# Patient Record
Sex: FEMALE | Race: WHITE | Employment: OTHER | URBAN - METROPOLITAN AREA
[De-identification: names, ages, dates, MRNs, and addresses within clinical notes are randomized per-mention and may not be internally consistent; named-entity substitution may affect disease eponyms.]

---

## 2017-03-10 ENCOUNTER — APPOINTMENT (OUTPATIENT)
Dept: CT IMAGING | Age: 82
DRG: 536 | End: 2017-03-10
Attending: EMERGENCY MEDICINE
Payer: MEDICARE

## 2017-03-10 ENCOUNTER — APPOINTMENT (OUTPATIENT)
Dept: GENERAL RADIOLOGY | Age: 82
DRG: 536 | End: 2017-03-10
Attending: EMERGENCY MEDICINE
Payer: MEDICARE

## 2017-03-10 ENCOUNTER — HOSPITAL ENCOUNTER (INPATIENT)
Age: 82
LOS: 3 days | Discharge: NURSING, REHAB OR LONG-TERM CARE | DRG: 536 | End: 2017-03-13
Attending: EMERGENCY MEDICINE | Admitting: FAMILY MEDICINE
Payer: MEDICARE

## 2017-03-10 DIAGNOSIS — F03.90 DEMENTIA WITHOUT BEHAVIORAL DISTURBANCE, UNSPECIFIED DEMENTIA TYPE: ICD-10-CM
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
## 2017-03-10 DIAGNOSIS — S72.114A CLOSED NONDISPLACED FRACTURE OF GREATER TROCHANTER OF RIGHT FEMUR, INITIAL ENCOUNTER (HCC): ICD-10-CM

## 2017-03-10 PROBLEM — S72.113A FRACTURE OF GREATER TROCHANTER (HCC): Status: ACTIVE | Noted: 2017-03-10

## 2017-03-10 LAB
ALBUMIN SERPL BCP-MCNC: 3.6 G/DL (ref 3.4–5)
ALBUMIN/GLOB SERPL: 1.3 {RATIO} (ref 0.8–1.7)
ALP SERPL-CCNC: 62 U/L (ref 45–117)
ALT SERPL-CCNC: 14 U/L (ref 13–56)
ANION GAP BLD CALC-SCNC: 6 MMOL/L (ref 3–18)
APTT PPP: 31.5 SEC (ref 23–36.4)
AST SERPL W P-5'-P-CCNC: 17 U/L (ref 15–37)
BASOPHILS # BLD AUTO: 0 K/UL (ref 0–0.1)
BASOPHILS # BLD: 0 % (ref 0–2)
BILIRUB SERPL-MCNC: 0.6 MG/DL (ref 0.2–1)
BUN SERPL-MCNC: 33 MG/DL (ref 7–18)
BUN/CREAT SERPL: 50 (ref 12–20)
CALCIUM SERPL-MCNC: 8.7 MG/DL (ref 8.5–10.1)
CHLORIDE SERPL-SCNC: 100 MMOL/L (ref 100–108)
CO2 SERPL-SCNC: 34 MMOL/L (ref 21–32)
CREAT SERPL-MCNC: 0.66 MG/DL (ref 0.6–1.3)
DIFFERENTIAL METHOD BLD: ABNORMAL
EOSINOPHIL # BLD: 0.2 K/UL (ref 0–0.4)
EOSINOPHIL NFR BLD: 2 % (ref 0–5)
ERYTHROCYTE [DISTWIDTH] IN BLOOD BY AUTOMATED COUNT: 14.4 % (ref 11.6–14.5)
GLOBULIN SER CALC-MCNC: 2.7 G/DL (ref 2–4)
GLUCOSE SERPL-MCNC: 93 MG/DL (ref 74–99)
HCT VFR BLD AUTO: 34.2 % (ref 35–45)
HGB BLD-MCNC: 10.9 G/DL (ref 12–16)
INR PPP: 1.1 (ref 0.8–1.2)
LYMPHOCYTES # BLD AUTO: 9 % (ref 21–52)
LYMPHOCYTES # BLD: 0.7 K/UL (ref 0.9–3.6)
MCH RBC QN AUTO: 31.5 PG (ref 24–34)
MCHC RBC AUTO-ENTMCNC: 31.9 G/DL (ref 31–37)
MCV RBC AUTO: 98.8 FL (ref 74–97)
MONOCYTES # BLD: 0.7 K/UL (ref 0.05–1.2)
MONOCYTES NFR BLD AUTO: 9 % (ref 3–10)
NEUTS SEG # BLD: 6.4 K/UL (ref 1.8–8)
NEUTS SEG NFR BLD AUTO: 80 % (ref 40–73)
PLATELET # BLD AUTO: 197 K/UL (ref 135–420)
PMV BLD AUTO: 10.4 FL (ref 9.2–11.8)
POTASSIUM SERPL-SCNC: 4 MMOL/L (ref 3.5–5.5)
PROT SERPL-MCNC: 6.3 G/DL (ref 6.4–8.2)
PROTHROMBIN TIME: 13.9 SEC (ref 11.5–15.2)
RBC # BLD AUTO: 3.46 M/UL (ref 4.2–5.3)
SODIUM SERPL-SCNC: 140 MMOL/L (ref 136–145)
WBC # BLD AUTO: 8 K/UL (ref 4.6–13.2)

## 2017-03-10 PROCEDURE — 74011250637 HC RX REV CODE- 250/637: Performed by: EMERGENCY MEDICINE

## 2017-03-10 PROCEDURE — 74011250637 HC RX REV CODE- 250/637: Performed by: FAMILY MEDICINE

## 2017-03-10 PROCEDURE — 70450 CT HEAD/BRAIN W/O DYE: CPT

## 2017-03-10 PROCEDURE — 80053 COMPREHEN METABOLIC PANEL: CPT | Performed by: EMERGENCY MEDICINE

## 2017-03-10 PROCEDURE — 93005 ELECTROCARDIOGRAM TRACING: CPT

## 2017-03-10 PROCEDURE — 65270000029 HC RM PRIVATE

## 2017-03-10 PROCEDURE — 71010 XR CHEST SNGL V: CPT

## 2017-03-10 PROCEDURE — 85610 PROTHROMBIN TIME: CPT | Performed by: EMERGENCY MEDICINE

## 2017-03-10 PROCEDURE — 85025 COMPLETE CBC W/AUTO DIFF WBC: CPT | Performed by: EMERGENCY MEDICINE

## 2017-03-10 PROCEDURE — 73502 X-RAY EXAM HIP UNI 2-3 VIEWS: CPT

## 2017-03-10 PROCEDURE — 99284 EMERGENCY DEPT VISIT MOD MDM: CPT

## 2017-03-10 PROCEDURE — 85730 THROMBOPLASTIN TIME PARTIAL: CPT | Performed by: EMERGENCY MEDICINE

## 2017-03-10 PROCEDURE — 74011250636 HC RX REV CODE- 250/636: Performed by: FAMILY MEDICINE

## 2017-03-10 RX ORDER — DONEPEZIL HYDROCHLORIDE 5 MG/1
20 TABLET, FILM COATED ORAL DAILY
Status: DISCONTINUED | OUTPATIENT
Start: 2017-03-11 | End: 2017-03-13 | Stop reason: HOSPADM

## 2017-03-10 RX ORDER — FUROSEMIDE 40 MG/1
40 TABLET ORAL DAILY
Status: DISCONTINUED | OUTPATIENT
Start: 2017-03-11 | End: 2017-03-13 | Stop reason: HOSPADM

## 2017-03-10 RX ORDER — GUAIFENESIN 100 MG/5ML
81 LIQUID (ML) ORAL DAILY
Status: DISCONTINUED | OUTPATIENT
Start: 2017-03-11 | End: 2017-03-13 | Stop reason: HOSPADM

## 2017-03-10 RX ORDER — ACETAMINOPHEN 325 MG/1
650 TABLET ORAL
Status: DISCONTINUED | OUTPATIENT
Start: 2017-03-10 | End: 2017-03-13 | Stop reason: HOSPADM

## 2017-03-10 RX ORDER — POTASSIUM CHLORIDE 750 MG/1
10 TABLET, EXTENDED RELEASE ORAL DAILY
Status: DISCONTINUED | OUTPATIENT
Start: 2017-03-11 | End: 2017-03-13 | Stop reason: HOSPADM

## 2017-03-10 RX ORDER — MELATONIN
2000 DAILY
Status: DISCONTINUED | OUTPATIENT
Start: 2017-03-11 | End: 2017-03-13 | Stop reason: HOSPADM

## 2017-03-10 RX ORDER — METOPROLOL TARTRATE 25 MG/1
12.5 TABLET, FILM COATED ORAL 2 TIMES DAILY
Status: DISCONTINUED | OUTPATIENT
Start: 2017-03-10 | End: 2017-03-13 | Stop reason: HOSPADM

## 2017-03-10 RX ORDER — OXYBUTYNIN CHLORIDE 5 MG/1
10 TABLET, EXTENDED RELEASE ORAL DAILY
Status: DISCONTINUED | OUTPATIENT
Start: 2017-03-11 | End: 2017-03-13 | Stop reason: HOSPADM

## 2017-03-10 RX ORDER — MEGESTROL ACETATE 40 MG/ML
200 SUSPENSION ORAL 2 TIMES DAILY
Status: DISCONTINUED | OUTPATIENT
Start: 2017-03-10 | End: 2017-03-13 | Stop reason: HOSPADM

## 2017-03-10 RX ORDER — DOCUSATE SODIUM 100 MG/1
100 CAPSULE, LIQUID FILLED ORAL 2 TIMES DAILY
Status: DISCONTINUED | OUTPATIENT
Start: 2017-03-10 | End: 2017-03-13 | Stop reason: HOSPADM

## 2017-03-10 RX ORDER — HEPARIN SODIUM 5000 [USP'U]/ML
5000 INJECTION, SOLUTION INTRAVENOUS; SUBCUTANEOUS EVERY 8 HOURS
Status: DISCONTINUED | OUTPATIENT
Start: 2017-03-10 | End: 2017-03-13 | Stop reason: HOSPADM

## 2017-03-10 RX ORDER — SODIUM CHLORIDE 9 MG/ML
75 INJECTION, SOLUTION INTRAVENOUS CONTINUOUS
Status: DISCONTINUED | OUTPATIENT
Start: 2017-03-10 | End: 2017-03-11

## 2017-03-10 RX ORDER — ASCORBIC ACID 250 MG
1000 TABLET ORAL DAILY
Status: DISCONTINUED | OUTPATIENT
Start: 2017-03-11 | End: 2017-03-13 | Stop reason: HOSPADM

## 2017-03-10 RX ORDER — HYDROCODONE BITARTRATE AND ACETAMINOPHEN 5; 325 MG/1; MG/1
1 TABLET ORAL
Status: DISCONTINUED | OUTPATIENT
Start: 2017-03-10 | End: 2017-03-13 | Stop reason: HOSPADM

## 2017-03-10 RX ORDER — METOPROLOL TARTRATE 25 MG/1
25 TABLET, FILM COATED ORAL 2 TIMES DAILY
Status: DISCONTINUED | OUTPATIENT
Start: 2017-03-10 | End: 2017-03-10

## 2017-03-10 RX ORDER — ACETAMINOPHEN 325 MG/1
650 TABLET ORAL
Status: COMPLETED | OUTPATIENT
Start: 2017-03-10 | End: 2017-03-10

## 2017-03-10 RX ORDER — SERTRALINE HYDROCHLORIDE 50 MG/1
100 TABLET, FILM COATED ORAL DAILY
Status: DISCONTINUED | OUTPATIENT
Start: 2017-03-11 | End: 2017-03-13 | Stop reason: HOSPADM

## 2017-03-10 RX ORDER — MEMANTINE HYDROCHLORIDE 10 MG/1
10 TABLET ORAL 2 TIMES DAILY
Status: DISCONTINUED | OUTPATIENT
Start: 2017-03-10 | End: 2017-03-13 | Stop reason: HOSPADM

## 2017-03-10 RX ORDER — ALBUTEROL SULFATE 0.83 MG/ML
2.5 SOLUTION RESPIRATORY (INHALATION)
Status: DISCONTINUED | OUTPATIENT
Start: 2017-03-10 | End: 2017-03-13 | Stop reason: HOSPADM

## 2017-03-10 RX ORDER — RISPERIDONE 0.25 MG/1
0.5 TABLET, FILM COATED ORAL
Status: DISCONTINUED | OUTPATIENT
Start: 2017-03-10 | End: 2017-03-13 | Stop reason: HOSPADM

## 2017-03-10 RX ADMIN — HYDROCODONE BITARTRATE AND ACETAMINOPHEN 1 TABLET: 5; 325 TABLET ORAL at 22:04

## 2017-03-10 RX ADMIN — HEPARIN SODIUM 5000 UNITS: 5000 INJECTION, SOLUTION INTRAVENOUS; SUBCUTANEOUS at 22:09

## 2017-03-10 RX ADMIN — HEPARIN SODIUM 5000 UNITS: 5000 INJECTION, SOLUTION INTRAVENOUS; SUBCUTANEOUS at 14:45

## 2017-03-10 RX ADMIN — METOPROLOL TARTRATE 12.5 MG: 25 TABLET ORAL at 19:21

## 2017-03-10 RX ADMIN — HYDROCODONE BITARTRATE AND ACETAMINOPHEN 1 TABLET: 5; 325 TABLET ORAL at 14:45

## 2017-03-10 RX ADMIN — ACETAMINOPHEN 650 MG: 325 TABLET, FILM COATED ORAL at 11:11

## 2017-03-10 RX ADMIN — DOCUSATE SODIUM 100 MG: 100 CAPSULE, LIQUID FILLED ORAL at 19:21

## 2017-03-10 RX ADMIN — RISPERIDONE 0.5 MG: 0.25 TABLET ORAL at 22:04

## 2017-03-10 RX ADMIN — SODIUM CHLORIDE 75 ML/HR: 900 INJECTION, SOLUTION INTRAVENOUS at 19:24

## 2017-03-10 RX ADMIN — MEGESTROL ACETATE 200 MG: 40 SUSPENSION ORAL at 22:04

## 2017-03-10 NOTE — ED TRIAGE NOTES
Pt was at home at [phone] went to go to the rest room had a fall. Pt denies hitting her head and pt is not on blood thinners. Pt has right hip pain and right ear pain.

## 2017-03-10 NOTE — H&P
[address] [place] [address]    Name:  Tong Michael  MR#:  [id number]  :  1924  Account #:  [de-identified]  Date of Adm:  03/10/2017      CHIEF COMPLAINT: Fall, right hip fracture. HISTORY OF PRESENT ILLNESS: The patient is a 68-year-old  female with a past medical history significant for chronic hypoxic  respiratory failure due to chronic obstructive pulmonary disease on 2  liters per minute around the clock, sick sinus syndrome with history of  pacemaker implantation, atrial fibrillation, hypertension, dementia, who  presents to the emergency department after having an unwitnessed  fall. History is obtained primarily from the son as the patient has a light  bit of dementia. She normally ambulates with a walker. This morning  around 6 or 6:30 a.m., the patient's son heard a thump, and found the  patient correction outside the bathroom. She had apparently tried to  ambulate up off the toilet, and suffered a fall. He believes that she may  have hit the right portion of the back of her head on a door frame. The  patient denies any loss of consciousness, dizziness or  lightheadedness, any headaches or visual changes or chest pain or  shortness of breath. The patient's son states that there was no seizure  activity, no bowel or bladder incontinence. She tried to get up, she  could not bear any weight because of right hip pain. She was then  brought to the emergency department. He noticed that she seemed to be a little bit more confused than  normal just after the fall. She seems to be a little bit more oriented now  and question at baseline. She has no other complaints at the current time and is resting  comfortably. In the emergency department, she had plain films performed showing  a right greater trochanteric fracture. She had a CT of her head, which  was negative for acute.  She was referred to our service and  Orthopedics has been consulted by the emergency department  physician. REVIEW OF SYSTEMS  The patient has a history of dementia. When I asked her questions,  she seems to answer blanket denials. She denies any headaches or  visual changes, dizziness or lightheadedness, any fevers or chills,  nausea, vomiting, chest pain, shortness of breath, palpitations, edema,  abdominal pain, dysuria, hematuria, polyuria, oliguria, constipation,  diarrhea, melena, hematochezia, rash, bruise, bleed or hot or cold  intolerance. The patient's son seems to agree. No recent illnesses. Review otherwise negative 10 element review. ALLERGIES  1. NSAIDS. 2. NITROFURANTOIN. MEDICATIONS: This is obtained from the medication list brought with  the patient and reviewed the patient's son    1. Namenda 10 mg p.o. b.i.d.  2. Lopressor 50 mg p.o. daily. 3. Ditropan XL 10 mg p.o. b.i.d.  4. Potassium chloride 10 mEq p.o. daily. 5. Aricept 20 mg tablets 2 tabs p.o. daily. 6. Megace suspension daily. 7. Aspirin 81 mg p.o. daily, but the patient's son stated that this was  discontinued, unclear reasons. 8. Albuterol nebulizer every 4 hours as needed for wheezing. 9. Synthroid 125 mcg p.o. daily. 10. Lasix 40 mg p.o. every a.m., 20 grams p.o. each evening. 11. Zoloft 100 mg p.o. daily. 12. Centrum Silver multivitamin p.o. daily. 13. Vitamin D3, 1000 units p.o. daily. 14. Vitamin C 1000 mg p.o. daily. 15. Risperidone 0.5 mg p.o. at bedtime. PAST MEDICAL HISTORY  1. Chronic hypoxic respiratory failure due to chronic obstructive  pulmonary disease on 2 liters per minute around the clock. 2. Sick sinus syndrome with history of pacemaker implantation and  atrial fibrillation. 3. Hypertension. 4. Dementia. PAST SURGICAL HISTORY: No recent surgeries. Remote history of  cholecystectomy, appendectomy, hysterectomy, pacemaker  implantation. FAMILY HISTORY: Denies. SOCIAL HISTORY: No alcohol, tobacco or any other drug use.  The  patient's son states that she wishes to be FULL CODE. PHYSICAL EXAMINATION  VITAL SIGNS: Last set of vitals includes a temperature of 97.5, pulse  62, respiratory rate 24, saturating 99%. She is on 2  liters when I examined her. GENERAL: On examination, the patient is a frail, elderly appearing  female who is awake, alert and pleasant and conversant. She appears  to be comfortable. Son present at bedside. HEENT: Head is normocephalic and atraumatic. Pupils equal, round  and reactive to light. Extraocular motions are intact. Nares are patent  on both sides with nasal cannula in place. Posterior pharynx is clear. Mucous membranes are tacky. Tongue is midline. NECK: Supple, no lymphadenopathy. CARDIOVASCULAR: Regular rate and rhythm, a 3/6 systolic  ejection murmur best heard at the left sternal margin. PULMONARY: Clear to auscultation bilaterally. ABDOMEN: Soft, nontender, nondistended. Normal bowel sounds. No  masses are felt. EXTREMITIES: No calf tenderness, no edema, 1+ bilateral lower  extremity pedal pulses. She has 5/5 motor strength x4 with the  exception of limited range of motion involving the right lower extremity  due to pain at the hip. Intact light touch sensation bilateral lower  extremities. NEUROLOGIC: Cranial nerves 2 through 12 grossly intact. LABORATORY DATA: Include a metabolic panel with a sodium of 140,  potassium 4.0, chloride 100, CO2 of 34, BUN 33, creatinine 0.66,  glucose 93, calcium 8.7. Liver function tests with a total protein of 6.3, albumin 3.6, total bilirubin  0.6, alkaline phosphatase 62, ALT 14, AST 17. CBC with a white count of 8.0, hemoglobin and hematocrit of  10.9/34.2, platelets of 646. Differential 80 segs, 9 lymphs, 9 monos, 2  eosinophils. PT/INR 13.9/1.1, APTT of 31.5. Chest x-ray negative for acute. Report shows stable cardiomegaly,  chronic prominent interstitial markings, predominantly within the upper  lungs without change consistent with chronic interstitial infiltrates.     CT scan of the head by report is negative for acute. No evidence of  intracranial hemorrhages or any other definable acute intracranial  process. No discernable fracture in skull. Additional chronic findings  were age-related findings. X-ray of the right hip shows right greater trochanteric fracture. I have ordered an EKG. At bedside, she appears to be paced in the 60s. IMPRESSION: A 80-year-old female with a history significant for the  above, who is here status post mechanical fall. She has a right hip  fracture. PLAN  1. Right hip fracture: Status post mechanical fall. Plan per Orthopedics. Deep venous thrombosis prophylaxis in the form of heparin 5000 units  subcu every 8 hours, we will maintain pain control as needed. N.p.o.  for the time being, except for medications. 2. Chronic hypoxic respiratory failure: due to chronic obstructive  pulmonary disease. No acute issues. Maintain 2 liters per minute  around the clock. Albuterol nebulizer as needed. 3. Hypertension: We will maintain her outpatient medications, spitting  up her Lopressor to twice daily dosing at a lower dose for the time  being. Review of the records and discussion with the patient's son  reveals that there was some concern about the patient having elevated  blood pressure and a low diastolic. Reviewing Cardiology notes from  an outpatient basis reveals that the preference is to maintain a slightly  higher pressures versus lower pressure causing hypotensive  symptoms. For the time being, I am going to go ahead and maintain  her on Lopressor 12.5 mg p.o. b.i.d. At bedside, her pressures have  been in the low 100s to 120s on the telemetry monitor. We will  continue to follow closely. 4. Sick sinus syndrome with a history of pacemaker implantation, atrial  fibrillation. I am not sure why the patient was discontinued from her  aspirin. I will go ahead and maintain this. Her last medication list has  this listed.  We will maintain her on the Lopressor dose as per above. Otherwise, no acute issues. We will check an EKG now.  5. Hypothyroidism: We will maintain Synthroid. Check TSH. 6. Dementia: Maintain her outpatient medication regimen. It looks like  she was on according to list Aricept at 20 mg daily, Namenda at 10 mg  b.i.d. I am going to maintain the Aricept and Namenda dosing. She is  on risperidone at night as well, which we will go ahead and continue. 7. Deep venous thrombosis prophylaxis in the form of heparin 5000  units subcu every 8 hours.         Reuel Fury, MD Sherryle Almond / Monika Rothman  D:  03/10/2017   13:11  T:  03/10/2017   13:49  Job #:  [id number]

## 2017-03-10 NOTE — ED PROVIDER NOTES
HPI Comments: 7:38 AM Fernando Mcmillan is a 80 y.o. female with a history of CAD, A-fib, and dementia who presents to the ED via EMS after an unwitnessed fall at home. Son states that she normally ambulates with a walker. This morning around 6:30 \"I heard a thump and I found her on the floor, group home outside of the bathroom\". He states that when he tried to get her up, she wouldn't put any weight on her RLE and was complaining of R hip pain so he carried her back to bed. He denies any changes to her behavior or mental status at present but [place] seemed a little more out of it when I first found her\". She does have a history of frequent falls, she is not on AC. Pt notes \"a little\" posterior R hip pain with ROM. No other acute symptoms or complaints were noted. The history is provided by the patient.         Past Medical History:   Diagnosis Date    Arrhythmia     a-fib has pacemaker    Arthritis of both knees     Severe, end-stage    Atrial fibrillation (HCC)     Atrial fibrillation (HCC)     Balance problems     Bursitis of right hip     Chronic; with mild arthritis    CAD (coronary artery disease)     pt denies    Cardiomyopathy ([address]     Cerebral microvascular disease     CT 10/2014    Distal radius fracture, left     Easy bruising     Easy bleeding    Extrinsic asthma, unspecified     Fall     H/O mitral valve prolapse     Hearing loss     Hemangioma     CT 10/2014    History of ankle fracture     HX OTHER MEDICAL     walker    Hypothyroidism     Idiopathic osteoporosis     Long term (current) use of anticoagulants     Memory loss     Moderate persistent asthma     Osteoporosis     Peptic ulcer     Pneumonia     Pure hyperglyceridemia     Right foot pain 2014    Thyroid disease     Urinary problem     Wears glasses     Weight loss     Mild       Past Surgical History:   Procedure Laterality Date    HX APPENDECTOMY      HX CHOLECYSTECTOMY      HX HEENT      Jaw surgery following injury    HX HERNIA REPAIR Bilateral     HX HYSTERECTOMY      HX ORTHOPAEDIC      procedure on back    HX ORTHOPAEDIC      Ankle surgery    HX OTHER SURGICAL      Breast implant surgeries    HX PACEMAKER      HX UROLOGICAL           Family History:   Problem Relation Age of Onset    Other Sister      Respiratory disease    Arthritis-osteo      Hypertension      Diabetes         Social History     Social History    Marital status:      Spouse name: N/A    Number of children: N/A    Years of education: N/A     Occupational History    Not on file. Social History Main Topics    Smoking status: Never Smoker    Smokeless tobacco: Not on file    Alcohol use No    Drug use: No    Sexual activity: No     Other Topics Concern    Not on file     Social History Narrative         ALLERGIES: Nsaids (non-steroidal anti-inflammatory drug) and Nitrofurantoin    Review of Systems   Unable to perform ROS: Dementia   Constitutional: Negative for diaphoresis and fever. HENT: Positive for ear pain. Negative for rhinorrhea and trouble swallowing. Eyes: Negative for visual disturbance. Respiratory: Negative for shortness of breath. Cardiovascular: Negative for chest pain. Gastrointestinal: Negative for vomiting. Musculoskeletal: Positive for arthralgias and gait problem. Negative for back pain and neck pain. (+) Right hip pain   Skin: Negative for wound. Neurological: Negative for headaches. Hematological: Bruises/bleeds easily. Psychiatric/Behavioral: Positive for confusion. All other systems reviewed and are negative. Vitals:    03/10/17 0730   BP: 141/75   Resp: 26   Temp: 97.5 °F (36.4 °C)   SpO2: 100%            Physical Exam   Constitutional: She appears well-developed and well-nourished. No distress. Resting comfortably on stretcher   HENT:   Head: Normocephalic and atraumatic.    MM moist   Eyes: Conjunctivae and EOM are normal. Pupils are equal, round, and reactive to light. No scleral icterus. Sclera clear bilaterally   Neck: Normal range of motion. Neck supple. No JVD present. Non-tender to palpation along posterior midline   Cardiovascular: Normal rate and regular rhythm. Exam reveals no gallop and no friction rub. Murmur heard. Pulmonary/Chest: Effort normal and breath sounds normal. No respiratory distress. She has no wheezes. She has no rales. She exhibits no tenderness. No crepitance with palpation   Abdominal: Soft. She exhibits no distension. There is no tenderness. Genitourinary:   Genitourinary Comments: No CVA tenderness   Musculoskeletal: She exhibits tenderness. She exhibits no edema or deformity. Normal inspection of upper extremities. No edema noted to bilateral lower extremities. No midline back tenderness to palpation. No tenderness with palpation along long bones, including clavicles. All compartments are soft and easily compressible. No pain with ROM to bilateral ankles, knees, wrists, elbows or shoulders. No gross deformities appreciated. Pt able to sit up in bed without R hip pain, able to range R hip but notes \"a little\" pain to posterior R hip, no tenderness with palpation. Lymphadenopathy:     She has no cervical adenopathy. Neurological: She is alert. No cranial nerve deficit. She exhibits normal muscle tone. Coordination normal.   Normal motor and sensation bilaterally to upper and lower extremities. [place]. Oriented to person and place. Skin: Skin is warm and dry. She is not diaphoretic. Psychiatric:   Grossly WNL   Vitals reviewed. MDM  Number of Diagnoses or Management Options  Diagnosis management comments: Pt with unwitnessed fall and subsequent R hip pain with ? AMS immediately after fall. No focal neuro findings at present but will check CT head. XR R hip and check labs. Treat pain. Reassess. Pt with R greater troch fx.   Discussed with pt and son, agree with admission plans, PT/OT. Discussed with Dr. Deanna Ponce, requests admission to Dr. Issa Jensen service. Discussed with Dr. Emry Siemens, will consult on pt. Amount and/or Complexity of Data Reviewed  Clinical lab tests: ordered and reviewed  Tests in the radiology section of CPT®: ordered and reviewed  Decide to obtain previous medical records or to obtain history from someone other than the patient: yes  Obtain history from someone other than the patient: yes  Discuss the patient with other providers: yes  Independent visualization of images, tracings, or specimens: yes    Risk of Complications, Morbidity, and/or Mortality  Presenting problems: moderate  Management options: moderate    Patient Progress  Patient progress: stable    ED Course       Procedures    XR hip:  R greater troch fx    CXR:  No acute pulmonary process, cardiomegaly. Scribe Attestation:     Padmaja Sierra, kannanibing for and in the presence of Chyna Slater MD March 10, 2017     Signed by: Sonia Chavez, March 10, 2017 at 7:44 AM     Physician Attestation:   I personally performed the services described in this documentation, reviewed and edited the documentation which was dictated to the scribe in my presence, and it accurately records my words and actions.  Chyna Slater MD  March 10, 2017

## 2017-03-10 NOTE — ED NOTES
Pt. Resting quietly in NAD; Assisted on/off bedpan w/o difficulty. Repositioned in bed. Siderails up. VSS.

## 2017-03-11 LAB
ANION GAP BLD CALC-SCNC: 5 MMOL/L (ref 3–18)
ATRIAL RATE: 60 BPM
BASOPHILS # BLD AUTO: 0 K/UL (ref 0–0.06)
BASOPHILS # BLD: 1 % (ref 0–2)
BUN SERPL-MCNC: 27 MG/DL (ref 7–18)
BUN/CREAT SERPL: 38 (ref 12–20)
CALCIUM SERPL-MCNC: 8.1 MG/DL (ref 8.5–10.1)
CALCULATED R AXIS, ECG10: -75 DEGREES
CALCULATED T AXIS, ECG11: 80 DEGREES
CHLORIDE SERPL-SCNC: 103 MMOL/L (ref 100–108)
CO2 SERPL-SCNC: 33 MMOL/L (ref 21–32)
CREAT SERPL-MCNC: 0.72 MG/DL (ref 0.6–1.3)
DIAGNOSIS, 93000: NORMAL
DIFFERENTIAL METHOD BLD: ABNORMAL
EOSINOPHIL # BLD: 0.1 K/UL (ref 0–0.4)
EOSINOPHIL NFR BLD: 1 % (ref 0–5)
ERYTHROCYTE [DISTWIDTH] IN BLOOD BY AUTOMATED COUNT: 13.9 % (ref 11.6–14.5)
GLUCOSE BLD STRIP.AUTO-MCNC: 187 MG/DL (ref 70–110)
GLUCOSE SERPL-MCNC: 113 MG/DL (ref 74–99)
HCT VFR BLD AUTO: 28.4 % (ref 35–45)
HGB BLD-MCNC: 8.9 G/DL (ref 12–16)
LYMPHOCYTES # BLD AUTO: 11 % (ref 21–52)
LYMPHOCYTES # BLD: 0.7 K/UL (ref 0.9–3.6)
MCH RBC QN AUTO: 31.1 PG (ref 24–34)
MCHC RBC AUTO-ENTMCNC: 31.3 G/DL (ref 31–37)
MCV RBC AUTO: 99.3 FL (ref 74–97)
MONOCYTES # BLD: 0.7 K/UL (ref 0.05–1.2)
MONOCYTES NFR BLD AUTO: 12 % (ref 3–10)
NEUTS SEG # BLD: 4.6 K/UL (ref 1.8–8)
NEUTS SEG NFR BLD AUTO: 75 % (ref 40–73)
PLATELET # BLD AUTO: 152 K/UL (ref 135–420)
PMV BLD AUTO: 10.4 FL (ref 9.2–11.8)
POTASSIUM SERPL-SCNC: 3.8 MMOL/L (ref 3.5–5.5)
Q-T INTERVAL, ECG07: 504 MS
QRS DURATION, ECG06: 198 MS
QTC CALCULATION (BEZET), ECG08: 507 MS
RBC # BLD AUTO: 2.86 M/UL (ref 4.2–5.3)
SODIUM SERPL-SCNC: 141 MMOL/L (ref 136–145)
TSH SERPL DL<=0.05 MIU/L-ACNC: 2.38 UIU/ML (ref 0.36–3.74)
VENTRICULAR RATE, ECG03: 61 BPM
WBC # BLD AUTO: 6.1 K/UL (ref 4.6–13.2)

## 2017-03-11 PROCEDURE — 84443 ASSAY THYROID STIM HORMONE: CPT | Performed by: FAMILY MEDICINE

## 2017-03-11 PROCEDURE — 65270000029 HC RM PRIVATE

## 2017-03-11 PROCEDURE — 74011250637 HC RX REV CODE- 250/637: Performed by: FAMILY MEDICINE

## 2017-03-11 PROCEDURE — 74011250636 HC RX REV CODE- 250/636: Performed by: ORTHOPAEDIC SURGERY

## 2017-03-11 PROCEDURE — 74011250636 HC RX REV CODE- 250/636: Performed by: FAMILY MEDICINE

## 2017-03-11 PROCEDURE — 82962 GLUCOSE BLOOD TEST: CPT

## 2017-03-11 PROCEDURE — 77010033678 HC OXYGEN DAILY

## 2017-03-11 PROCEDURE — 85025 COMPLETE CBC W/AUTO DIFF WBC: CPT | Performed by: FAMILY MEDICINE

## 2017-03-11 PROCEDURE — 36415 COLL VENOUS BLD VENIPUNCTURE: CPT | Performed by: FAMILY MEDICINE

## 2017-03-11 PROCEDURE — 80048 BASIC METABOLIC PNL TOTAL CA: CPT | Performed by: FAMILY MEDICINE

## 2017-03-11 RX ORDER — HYDROMORPHONE HYDROCHLORIDE 1 MG/ML
0.5 INJECTION, SOLUTION INTRAMUSCULAR; INTRAVENOUS; SUBCUTANEOUS
Status: DISCONTINUED | OUTPATIENT
Start: 2017-03-11 | End: 2017-03-11

## 2017-03-11 RX ADMIN — POTASSIUM CHLORIDE 10 MEQ: 750 TABLET, EXTENDED RELEASE ORAL at 08:58

## 2017-03-11 RX ADMIN — OXYBUTYNIN CHLORIDE 10 MG: 5 TABLET, FILM COATED, EXTENDED RELEASE ORAL at 09:05

## 2017-03-11 RX ADMIN — MEGESTROL ACETATE 200 MG: 40 SUSPENSION ORAL at 21:13

## 2017-03-11 RX ADMIN — HEPARIN SODIUM 5000 UNITS: 5000 INJECTION, SOLUTION INTRAVENOUS; SUBCUTANEOUS at 13:01

## 2017-03-11 RX ADMIN — SERTRALINE HYDROCHLORIDE 100 MG: 50 TABLET ORAL at 09:06

## 2017-03-11 RX ADMIN — RISPERIDONE 0.5 MG: 0.25 TABLET ORAL at 21:13

## 2017-03-11 RX ADMIN — VITAMIN D, TAB 1000IU (100/BT) 2000 UNITS: 25 TAB at 09:05

## 2017-03-11 RX ADMIN — FUROSEMIDE 40 MG: 40 TABLET ORAL at 09:06

## 2017-03-11 RX ADMIN — MEMANTINE 10 MG: 10 TABLET ORAL at 17:10

## 2017-03-11 RX ADMIN — METOPROLOL TARTRATE 12.5 MG: 25 TABLET ORAL at 17:10

## 2017-03-11 RX ADMIN — ASPIRIN 81 MG CHEWABLE TABLET 81 MG: 81 TABLET CHEWABLE at 09:06

## 2017-03-11 RX ADMIN — DOCUSATE SODIUM 100 MG: 100 CAPSULE, LIQUID FILLED ORAL at 09:04

## 2017-03-11 RX ADMIN — MEGESTROL ACETATE 200 MG: 40 SUSPENSION ORAL at 08:59

## 2017-03-11 RX ADMIN — LEVOTHYROXINE SODIUM 125 MCG: 100 TABLET ORAL at 06:53

## 2017-03-11 RX ADMIN — Medication 1000 MG: at 09:05

## 2017-03-11 RX ADMIN — SODIUM CHLORIDE 75 ML/HR: 900 INJECTION, SOLUTION INTRAVENOUS at 10:32

## 2017-03-11 RX ADMIN — MEMANTINE 10 MG: 10 TABLET ORAL at 09:00

## 2017-03-11 RX ADMIN — HYDROCODONE BITARTRATE AND ACETAMINOPHEN 1 TABLET: 5; 325 TABLET ORAL at 06:53

## 2017-03-11 RX ADMIN — DOCUSATE SODIUM 100 MG: 100 CAPSULE, LIQUID FILLED ORAL at 17:10

## 2017-03-11 RX ADMIN — HEPARIN SODIUM 5000 UNITS: 5000 INJECTION, SOLUTION INTRAVENOUS; SUBCUTANEOUS at 05:01

## 2017-03-11 RX ADMIN — METOPROLOL TARTRATE 12.5 MG: 25 TABLET ORAL at 09:04

## 2017-03-11 RX ADMIN — MULTIPLE VITAMINS W/ MINERALS TAB 1 TABLET: TAB at 09:05

## 2017-03-11 RX ADMIN — HYDROMORPHONE HYDROCHLORIDE 0.5 MG: 1 INJECTION, SOLUTION INTRAMUSCULAR; INTRAVENOUS; SUBCUTANEOUS at 08:58

## 2017-03-11 RX ADMIN — DONEPEZIL HYDROCHLORIDE 20 MG: 5 TABLET, FILM COATED ORAL at 09:04

## 2017-03-11 NOTE — ROUTINE PROCESS
Bedside and Verbal shift change report given to Kathleen Soto RN (oncoming nurse) by [employer] (offgoing nurse). Report included the following information SBAR and [employer].

## 2017-03-11 NOTE — CONSULTS
[facility]    Consult Note    Patient: Oksana Mike               Sex: female          DOA: 3/10/2017         YOB: 1924      Age:  80 y.o.        LOS:  LOS: 1 day              HPI:     Oksana Mike is a 80 y.o. female who has been seen for right leg pain. She presented to the emergency department after having an unwitnessed  fall. History is obtained primarily from the son as the patient has a light  bit of dementia. She normally ambulates with a walker. This morning  around 6 or 6:30 a.m., the patient's son heard a thump, and found the  patient retirement outside the bathroom. She had apparently tried to  ambulate up off the toilet, and suffered a fall. He believes that she may  have hit the right portion of the back of her head on a door frame. The  patient denies any loss of consciousness, dizziness or  lightheadedness, any headaches or visual changes or chest pain or  shortness of breath. The patient's son states that there was no seizure  activity, no bowel or bladder incontinence. She tried to get up, she  could not bear any weight because of right hip pain. She was then  brought to the emergency department.     Past Medical History:   Diagnosis Date    Arrhythmia     a-fib has pacemaker    Arthritis of both knees     Severe, end-stage    Atrial fibrillation (HCC)     Atrial fibrillation (HCC)     Balance problems     Bursitis of right hip     Chronic; with mild arthritis    CAD (coronary artery disease)     pt denies    Cardiomyopathy ([facility] [address]     Cerebral microvascular disease     CT 10/2014    Distal radius fracture, left     Easy bruising     Easy bleeding    Extrinsic asthma, unspecified     Fall     H/O mitral valve prolapse     Hearing loss     Hemangioma     CT 10/2014    History of ankle fracture     HX OTHER MEDICAL     walker    Hypothyroidism     Idiopathic osteoporosis     Long term (current) use of anticoagulants     Memory loss     Moderate persistent asthma     Osteoporosis     Peptic ulcer     Pneumonia     Pure hyperglyceridemia     Right foot pain 2014    Thyroid disease     Urinary problem     Wears glasses     Weight loss     Mild       Past Surgical History:   Procedure Laterality Date    HX APPENDECTOMY      HX CHOLECYSTECTOMY      HX HEENT      Jaw surgery following injury    HX HERNIA REPAIR Bilateral     HX HYSTERECTOMY      HX ORTHOPAEDIC      procedure on back    HX ORTHOPAEDIC      Ankle surgery    HX OTHER SURGICAL      Breast implant surgeries    HX PACEMAKER      HX UROLOGICAL         Family History   Problem Relation Age of Onset    Other Sister      Respiratory disease    Arthritis-osteo      Hypertension      Diabetes         Social History     Social History    Marital status:      Spouse name: N/A    Number of children: N/A    Years of education: N/A     Social History Main Topics    Smoking status: Never Smoker    Smokeless tobacco: None    Alcohol use No    Drug use: No    Sexual activity: No     Other Topics Concern    None     Social History Narrative       Prior to Admission medications    Medication Sig Start Date End Date Taking? Authorizing Provider   risperiDONE (RISPERDAL) 0.5 mg tablet Take  by mouth. Historical Provider   potassium chloride SR (KLOR-CON 10) 10 mEq tablet Take 10 mEq by mouth daily. Jaja Palmer MD   memantine (NAMENDA) 10 mg tablet Take 10 mg by mouth two (2) times a day. Indications: MODERATE TO SEVERE ALZHEIMER'S TYPE DEMENTIA    Jaja Palmer MD   docusate sodium (COLACE) 100 mg capsule Take 100 mg by mouth two (2) times a day. Jaja Palmer MD   cholecalciferol (VITAMIN D3) 1,000 unit tablet Take 2 Tabs by mouth daily. 2/8/16   Roberto Stinson MD   metoprolol (LOPRESSOR) 25 mg tablet Take 1 Tab by mouth two (2) times a day. Patient taking differently: Take 50 mg by mouth daily.  2/8/16   Roberto Stinson MD   furosemide (LASIX) 20 mg tablet Take 40 mg by mouth daily. Historical Provider   megestrol (MEGACE) 400 mg/10 mL (40 mg/mL) suspension Take 200 mg by mouth two (2) times a day. Historical Provider   donepezil (ARICEPT) 5 mg tablet Take 10 mg by mouth daily. Historical Provider   albuterol (PROVENTIL VENTOLIN) 2.5 mg /3 mL (0.083 %) nebulizer solution 3 mL by Nebulization route every six (6) hours as needed for Wheezing or Shortness of Breath. 10/14/15   Stephanie Chamorro MD   aspirin 81 mg chewable tablet Take 1 Tab by mouth daily. 10/14/15   Stephanie Chamorro MD   oxybutynin chloride XL (DITROPAN XL) 10 mg CR tablet Take 10 mg by mouth daily. Jaja Palmer MD   levothyroxine (SYNTHROID) 125 mcg tablet Take 125 mcg by mouth Daily (before breakfast). Historical Provider   multivitamins-minerals-lutein (CENTRUM SILVER) tab tablet Take 1 Tab by mouth daily. Jaja Palmer MD   sertraline (ZOLOFT) 100 mg tablet Take 100 mg by mouth daily. Jaja Palmer MD       Allergies   Allergen Reactions    Nsaids (Non-Steroidal Anti-Inflammatory Drug) Other (comments)     Peptic Ulcer    Nitrofurantoin Other (comments)     ? PULM FIBROSIS       Review of Systems  Negative for any fever, chills, sweats, headache, blurred vision, cough, congestion, SOB, abdominal pain, bleeding, bruising, numbness, or tingling. 12 point ROS otherwise negative other than noted in the HPI      Physical Exam:      Visit Vitals    /54 (BP Patient Position: At rest)    Pulse (!) 59    Temp 97.9 °F (36.6 °C)    Resp 20    Ht 5' 5\" (1.651 m)    Wt 125 lb (56.7 kg)    SpO2 96%    BMI 20.8 kg/m2       Physical Exam:  General: Well developed, well nourished, 80 y.o. female in  NAD   Vitals: Blood pressure 113/54, pulse (!) 59, temperature 97.9 °F (36.6 °C), resp. rate 20, height 5' 5\" (1.651 m), weight 125 lb (56.7 kg), SpO2 96 %. Skin: No rashs, ulcers, icterus, or other obvious lesions/ lacerations  Eyes: EOM intact, PERRLA   ENM: Without obvious lesions.  Nares patent. Moist mucous membranes. Neck: Supple, FROM   Lungs: CTAB   Heart: RRR, normal S1, S2. No murmurs, rubs, or gallops  Abdomen: Soft, NT, bowel sounds present all 4 quadrants   Musculoskeletal:   Inspection of right hip  Mild swelling  Bruising noted  Pain with range of motion  Neuro: AAOx3. Without obvious deficits     Labs Reviewed:  BMP:   Lab Results   Component Value Date/Time     03/11/2017 04:24 AM    K 3.8 03/11/2017 04:24 AM     03/11/2017 04:24 AM    CO2 33 (H) 03/11/2017 04:24 AM    AGAP 5 03/11/2017 04:24 AM     (H) 03/11/2017 04:24 AM    BUN 27 (H) 03/11/2017 04:24 AM    CREA 0.72 03/11/2017 04:24 AM    GFRAA >60 03/11/2017 04:24 AM    GFRNA >60 03/11/2017 04:24 AM     CMP:   Lab Results   Component Value Date/Time     03/11/2017 04:24 AM    K 3.8 03/11/2017 04:24 AM     03/11/2017 04:24 AM    CO2 33 (H) 03/11/2017 04:24 AM    AGAP 5 03/11/2017 04:24 AM     (H) 03/11/2017 04:24 AM    BUN 27 (H) 03/11/2017 04:24 AM    CREA 0.72 03/11/2017 04:24 AM    GFRAA >60 03/11/2017 04:24 AM    GFRNA >60 03/11/2017 04:24 AM    CA 8.1 (L) 03/11/2017 04:24 AM    ALB 3.6 03/10/2017 10:50 AM    TP 6.3 (L) 03/10/2017 10:50 AM    GLOB 2.7 03/10/2017 10:50 AM    AGRAT 1.3 03/10/2017 10:50 AM    SGOT 17 03/10/2017 10:50 AM    ALT 14 03/10/2017 10:50 AM     CBC:   Lab Results   Component Value Date/Time    WBC 6.1 03/11/2017 04:24 AM    HGB 8.9 (L) 03/11/2017 04:24 AM    HCT 28.4 (L) 03/11/2017 04:24 AM     03/11/2017 04:24 AM     IMAGING: AP pelvis and frog view of the right hip obtained. The bony pelvic ring is  intact. There is lucency extending through the greater trochanter of the right  hip consistent with fracture through the greater trochanter. No definite  extension through the intertrochanteric space demonstrated. Mild bilateral  narrowing of the hip joint spaces.  Small crossing metal foreign bodies located  in the low anterior subcutaneous tissues of the pelvis are unchanged.     Assessment/Plan   [unfilled]  Active Problems:    Fall (3/10/2017)      Fracture of greater trochanter ([address] (3/10/2017)        Pt.  Stable  No surgical indications at this time  PT/OT - out of bed to chair, NWB right leg with walker, rom as tolerated  DVT prophylaxis per medicine team  Pain control - IV dilaudid added for breakthrough pain      Alessandra Onofre PA-C   [facility]   [phone]

## 2017-03-11 NOTE — ROUTINE PROCESS
TRANSFER - IN REPORT:    Verbal report received from [facility] (name) on Roger Hunt  being received from ED (unit) for routine progression of care      Report consisted of patients Situation, Background, Assessment and   Recommendations(SBAR). Information from the following report(s) SBAR and Kardex was reviewed with the receiving nurse. Opportunity for questions and clarification was provided. Assessment completed upon patients arrival to unit and care assumed.

## 2017-03-11 NOTE — PROGRESS NOTES
[facility]ist [facility]  Progress Note    Patient: Brianna Mullen Age: 80 y.o. : 1924 MR#: [id number] SSN: [id number]  Date/Time: 3/11/2017 2:17 PM    Subjective:     Seen with son @ bedside. No F/C, N/V, CP, SOB, pain c/o. Assessment/Plan:   1. R hip fx - ortho eval appreciated. Conservative tx, pain control, PT/OT/dispo planning. 2. Chronic hypoxic resp failure due to COPD - no acute. On 2lpm around-the-clock. Outpt meds. 3. HTN - BPs wnl. Following. Have split lopressor dose to BID dosing while in-house. 4. SSS with hx pacer, AFib - ASA. BBlocker. No acute. 5. Hypothyroidism - Synthroid. TSH @ 2.38.   6. Dementia - by hx. At baseline. Outpt meds. 7. Dispo planning. Additional Notes:      Case discussed with:  [x]Patient  [x]Family  []Nursing  []Case Management  DVT Prophylaxis:  []Lovenox  [x]Hep SQ  []SCDs  []Coumadin   []On Heparin gtt    Objective:   VS:   Visit Vitals    /59 (BP 1 Location: Left arm, BP Patient Position: Head of bed elevated (Comment degrees))    Pulse 62    Temp 97 °F (36.1 °C)    Resp 18    Ht 5' 5\" (1.651 m)    Wt 56.7 kg (125 lb)    SpO2 97%    BMI 20.8 kg/m2      Tmax/24hrs: Temp (24hrs), Av.7 °F (36.5 °C), Min:97 °F (36.1 °C), Max:97.9 °F (36.6 °C)    Intake/Output Summary (Last 24 hours) at 17 1417  Last data filed at 03/10/17 1852   Gross per 24 hour   Intake                0 ml   Output             1000 ml   Net            -1000 ml       General:  Awake, alert, NAD. Cardiovascular:  RRR 3/6 EVELINE. Pulmonary:  CTA B.   GI:  Soft, NT/ND, NABS. Extremities:  No CT or edema.    Additional:      Labs:    Recent Results (from the past 24 hour(s))   EKG, 12 LEAD, INITIAL    Collection Time: 03/10/17  2:36 PM   Result Value Ref Range    Ventricular Rate 61 BPM    Atrial Rate 60 BPM    QRS Duration 198 ms    Q-T Interval 504 ms    QTC Calculation (Bezet) 507 ms    Calculated R Axis -75 degrees    Calculated T Axis 80 degrees    Diagnosis       Electronic ventricular pacemaker  When compared with ECG of 08-SEP-2016 14:51,  Vent. rate has decreased BY   2 BPM  Confirmed by Atiya Johnson MD, Reggy Box ([id number]) on 3/11/2017 11:23:29 AM     CBC WITH AUTOMATED DIFF    Collection Time: 03/11/17  4:24 AM   Result Value Ref Range    WBC 6.1 4.6 - 13.2 K/uL    RBC 2.86 (L) 4.20 - 5.30 M/uL    HGB 8.9 (L) 12.0 - 16.0 g/dL    HCT 28.4 (L) 35.0 - 45.0 %    MCV 99.3 (H) 74.0 - 97.0 FL    MCH 31.1 24.0 - 34.0 PG    MCHC 31.3 31.0 - 37.0 g/dL    RDW 13.9 11.6 - 14.5 %    PLATELET 181 012 - 792 K/uL    MPV 10.4 9.2 - 11.8 FL    NEUTROPHILS 75 (H) 40 - 73 %    LYMPHOCYTES 11 (L) 21 - 52 %    MONOCYTES 12 (H) 3 - 10 %    EOSINOPHILS 1 0 - 5 %    BASOPHILS 1 0 - 2 %    ABS. NEUTROPHILS 4.6 1.8 - 8.0 K/UL    ABS. LYMPHOCYTES 0.7 (L) 0.9 - 3.6 K/UL    ABS. MONOCYTES 0.7 0.05 - 1.2 K/UL    ABS. EOSINOPHILS 0.1 0.0 - 0.4 K/UL    ABS.  BASOPHILS 0.0 0.0 - 0.06 K/UL    DF AUTOMATED     METABOLIC PANEL, BASIC    Collection Time: 03/11/17  4:24 AM   Result Value Ref Range    Sodium 141 136 - 145 mmol/L    Potassium 3.8 3.5 - 5.5 mmol/L    Chloride 103 100 - 108 mmol/L    CO2 33 (H) 21 - 32 mmol/L    Anion gap 5 3.0 - 18 mmol/L    Glucose 113 (H) 74 - 99 mg/dL    BUN 27 (H) 7.0 - 18 MG/DL    Creatinine 0.72 0.6 - 1.3 MG/DL    BUN/Creatinine ratio 38 (H) 12 - 20      GFR est AA >60 >60 ml/min/1.73m2    GFR est non-AA >60 >60 ml/min/1.73m2    Calcium 8.1 (L) 8.5 - 10.1 MG/DL   TSH 3RD GENERATION    Collection Time: 03/11/17  4:24 AM   Result Value Ref Range    TSH 2.38 0.36 - 3.74 uIU/mL       Signed By: Susan York MD     March 11, 2017 2:17 PM

## 2017-03-11 NOTE — ROUTINE PROCESS
TRANSFER - OUT REPORT:    Verbal report given to SANGEETHA Torres on Arnav Domingo  being transferred to [facility] (unit) for routine progression of care       Report consisted of patients Situation, Background, Assessment and   Recommendations(SBAR). Information from the following report(s) SBAR, ED Summary and MAR was reviewed with the receiving nurse. Lines:       Opportunity for questions and clarification was provided.       Patient transported with:   O2 @ 2 liters   RN

## 2017-03-12 LAB
ANION GAP BLD CALC-SCNC: 7 MMOL/L (ref 3–18)
BASOPHILS # BLD AUTO: 0 K/UL (ref 0–0.1)
BASOPHILS # BLD: 1 % (ref 0–2)
BUN SERPL-MCNC: 27 MG/DL (ref 7–18)
BUN/CREAT SERPL: 49 (ref 12–20)
CALCIUM SERPL-MCNC: 8.1 MG/DL (ref 8.5–10.1)
CHLORIDE SERPL-SCNC: 104 MMOL/L (ref 100–108)
CO2 SERPL-SCNC: 28 MMOL/L (ref 21–32)
CREAT SERPL-MCNC: 0.55 MG/DL (ref 0.6–1.3)
DIFFERENTIAL METHOD BLD: ABNORMAL
EOSINOPHIL # BLD: 0.1 K/UL (ref 0–0.4)
EOSINOPHIL NFR BLD: 2 % (ref 0–5)
ERYTHROCYTE [DISTWIDTH] IN BLOOD BY AUTOMATED COUNT: 14.2 % (ref 11.6–14.5)
GLUCOSE SERPL-MCNC: 96 MG/DL (ref 74–99)
HCT VFR BLD AUTO: 29.2 % (ref 35–45)
HGB BLD-MCNC: 9.2 G/DL (ref 12–16)
LYMPHOCYTES # BLD AUTO: 12 % (ref 21–52)
LYMPHOCYTES # BLD: 0.9 K/UL (ref 0.9–3.6)
MCH RBC QN AUTO: 31.3 PG (ref 24–34)
MCHC RBC AUTO-ENTMCNC: 31.5 G/DL (ref 31–37)
MCV RBC AUTO: 99.3 FL (ref 74–97)
MONOCYTES # BLD: 0.9 K/UL (ref 0.05–1.2)
MONOCYTES NFR BLD AUTO: 11 % (ref 3–10)
NEUTS SEG # BLD: 5.6 K/UL (ref 1.8–8)
NEUTS SEG NFR BLD AUTO: 74 % (ref 40–73)
PLATELET # BLD AUTO: 158 K/UL (ref 135–420)
PMV BLD AUTO: 10.5 FL (ref 9.2–11.8)
POTASSIUM SERPL-SCNC: 4.1 MMOL/L (ref 3.5–5.5)
RBC # BLD AUTO: 2.94 M/UL (ref 4.2–5.3)
SODIUM SERPL-SCNC: 139 MMOL/L (ref 136–145)
WBC # BLD AUTO: 7.6 K/UL (ref 4.6–13.2)

## 2017-03-12 PROCEDURE — 74011250637 HC RX REV CODE- 250/637: Performed by: FAMILY MEDICINE

## 2017-03-12 PROCEDURE — 36415 COLL VENOUS BLD VENIPUNCTURE: CPT | Performed by: FAMILY MEDICINE

## 2017-03-12 PROCEDURE — 80048 BASIC METABOLIC PNL TOTAL CA: CPT | Performed by: FAMILY MEDICINE

## 2017-03-12 PROCEDURE — 85025 COMPLETE CBC W/AUTO DIFF WBC: CPT | Performed by: FAMILY MEDICINE

## 2017-03-12 PROCEDURE — 65270000029 HC RM PRIVATE

## 2017-03-12 PROCEDURE — 97530 THERAPEUTIC ACTIVITIES: CPT

## 2017-03-12 PROCEDURE — 74011250636 HC RX REV CODE- 250/636: Performed by: FAMILY MEDICINE

## 2017-03-12 PROCEDURE — 97161 PT EVAL LOW COMPLEX 20 MIN: CPT

## 2017-03-12 RX ADMIN — RISPERIDONE 0.5 MG: 0.25 TABLET ORAL at 22:40

## 2017-03-12 RX ADMIN — POTASSIUM CHLORIDE 10 MEQ: 750 TABLET, EXTENDED RELEASE ORAL at 10:10

## 2017-03-12 RX ADMIN — Medication 1000 MG: at 10:07

## 2017-03-12 RX ADMIN — MEGESTROL ACETATE 200 MG: 40 SUSPENSION ORAL at 10:08

## 2017-03-12 RX ADMIN — METOPROLOL TARTRATE 12.5 MG: 25 TABLET ORAL at 10:10

## 2017-03-12 RX ADMIN — DOCUSATE SODIUM 100 MG: 100 CAPSULE, LIQUID FILLED ORAL at 18:01

## 2017-03-12 RX ADMIN — HEPARIN SODIUM 5000 UNITS: 5000 INJECTION, SOLUTION INTRAVENOUS; SUBCUTANEOUS at 22:41

## 2017-03-12 RX ADMIN — LEVOTHYROXINE SODIUM 125 MCG: 100 TABLET ORAL at 08:51

## 2017-03-12 RX ADMIN — HYDROCODONE BITARTRATE AND ACETAMINOPHEN 1 TABLET: 5; 325 TABLET ORAL at 22:38

## 2017-03-12 RX ADMIN — FUROSEMIDE 40 MG: 40 TABLET ORAL at 10:10

## 2017-03-12 RX ADMIN — MEMANTINE 10 MG: 10 TABLET ORAL at 18:01

## 2017-03-12 RX ADMIN — ASPIRIN 81 MG CHEWABLE TABLET 81 MG: 81 TABLET CHEWABLE at 10:10

## 2017-03-12 RX ADMIN — MEMANTINE 10 MG: 10 TABLET ORAL at 10:09

## 2017-03-12 RX ADMIN — VITAMIN D, TAB 1000IU (100/BT) 2000 UNITS: 25 TAB at 10:10

## 2017-03-12 RX ADMIN — DOCUSATE SODIUM 100 MG: 100 CAPSULE, LIQUID FILLED ORAL at 10:10

## 2017-03-12 RX ADMIN — ACETAMINOPHEN 650 MG: 325 TABLET, FILM COATED ORAL at 18:02

## 2017-03-12 RX ADMIN — OXYBUTYNIN CHLORIDE 10 MG: 5 TABLET, FILM COATED, EXTENDED RELEASE ORAL at 10:07

## 2017-03-12 RX ADMIN — MULTIPLE VITAMINS W/ MINERALS TAB 1 TABLET: TAB at 10:09

## 2017-03-12 RX ADMIN — SERTRALINE HYDROCHLORIDE 100 MG: 50 TABLET ORAL at 10:10

## 2017-03-12 RX ADMIN — METOPROLOL TARTRATE 12.5 MG: 25 TABLET ORAL at 18:01

## 2017-03-12 RX ADMIN — MEGESTROL ACETATE 200 MG: 40 SUSPENSION ORAL at 22:38

## 2017-03-12 RX ADMIN — HYDROCODONE BITARTRATE AND ACETAMINOPHEN 1 TABLET: 5; 325 TABLET ORAL at 00:11

## 2017-03-12 RX ADMIN — HYDROCODONE BITARTRATE AND ACETAMINOPHEN 1 TABLET: 5; 325 TABLET ORAL at 10:25

## 2017-03-12 RX ADMIN — DONEPEZIL HYDROCHLORIDE 20 MG: 5 TABLET, FILM COATED ORAL at 10:09

## 2017-03-12 NOTE — PROGRESS NOTES
Pt gave this writer permission to speak with son, who at this pt's bed side signed [facility] for SNF, [facility]  [facility] Spoke with Jorge Whitmore R.N. Regarding this referral request, who reports that the will follow this pt. Pt also placed in e-discharge and one stop for [facility].

## 2017-03-12 NOTE — ROUTINE PROCESS
Bedside and Verbal shift change report given to Vanessa Flores RN (oncoming nurse) by Erica Guadarrama RN (offgoing nurse). Report included the following information SBAR, Kardex, MAR and Recent Results. SITUATION:    Code Status: Full Code  Reason for Admission: Fracture of greater trochanter [facility])  [address] day: 2   Problem List:       Hospital Problems  Date Reviewed: 10/26/2016          Codes Class Noted POA    Fall ICD-10-CM: W19. Sandra Limbo  ICD-9-CM: E888.9  3/10/2017 Unknown        Fracture of greater trochanter ([facility] [address] ICD-10-CM: D14.250F  ICD-9-CM: 820.20  3/10/2017 Unknown              BACKGROUND:    Past Medical History:   Past Medical History:   Diagnosis Date    Arrhythmia     a-fib has pacemaker    Arthritis of both knees     Severe, end-stage    Atrial fibrillation (HCC)     Atrial fibrillation (HCC)     Balance problems     Bursitis of right hip     Chronic; with mild arthritis    CAD (coronary artery disease)     pt denies    Cardiomyopathy ([facility] [address]     Cerebral microvascular disease     CT 10/2014    Distal radius fracture, left     Easy bruising     Easy bleeding    Extrinsic asthma, unspecified     Fall     H/O mitral valve prolapse     Hearing loss     Hemangioma     CT 10/2014    History of ankle fracture     HX OTHER MEDICAL     walker    Hypothyroidism     Idiopathic osteoporosis     Long term (current) use of anticoagulants     Memory loss     Moderate persistent asthma     Osteoporosis     Peptic ulcer     Pneumonia     Pure hyperglyceridemia     Right foot pain 2014    Thyroid disease     Urinary problem     Wears glasses     Weight loss     Mild         Patient taking anticoagulants no     ASSESSMENT:    Changes in Assessment Throughout Shift: none     Patient has Central Line: no Reasons if yes: na   Patient has Goldstein Cath: no Reasons if yes: na      Last Vitals:     Vitals:    03/11/17 0847 03/11/17 1200 03/11/17 1559 03/11/17 2010   BP: 151/72 101/59 110/55 123/59   Pulse: 63 62 63 62   Resp: 18 18 18 18   Temp: 97.9 °F (36.6 °C) 97 °F (36.1 °C) 97.7 °F (36.5 °C) 97.7 °F (36.5 °C)   SpO2: 96% 97% 97% 96%   Weight:       Height:            IV and DRAINS (will only show if present)         WOUND (if present)   Wound Type:  none   Dressing present Dressing Present : Yes   Wound Concerns/Notes:  none     PAIN    Pain Assessment    Pain Intensity 1: 0 (03/11/17 2010)    Pain Location 1: Hip    Pain Intervention(s) 1: Medication (see MAR)    Patient Stated Pain Goal: 0  o Interventions for Pain:  reposition  o Intervention effective: yes  o Time of last intervention: 031117   o Reassessment Completed: yes      Last 3 Weights:  Last 3 Recorded Weights in this Encounter    03/11/17 0607   Weight: 56.7 kg (125 lb)     Weight change:      INTAKE/OUPUT    Current Shift:      Last three shifts: 03/10 0701 - 03/11 1900  In: -   Out: 1000 [Urine:1000]     LAB RESULTS     Recent Labs      03/11/17   0424  03/10/17   0939   WBC  6.1  8.0   HGB  8.9*  10.9*   HCT  28.4*  34.2*   PLT  152  197        Recent Labs      03/11/17   0424  03/10/17   1050  03/10/17   0939   NA  141  140   --    K  3.8  4.0   --    GLU  113*  93   --    BUN  27*  33*   --    CREA  0.72  0.66   --    CA  8.1*  8.7   --    INR   --    --   1.1       RECOMMENDATIONS AND DISCHARGE PLANNING     1. Pending tests/procedures/ Plan of Care or Other Needs: none     2. Discharge plan for patient and Needs/Barriers: Rehab    3. Estimated Discharge Date: [id number] Posted on Whiteboard in [facility]: yes      4. The patient's care plan was reviewed with the oncoming nurse. \"HEALS\" SAFETY CHECK      Fall Risk    Total Score: 3    Safety Measures: Safety Measures: Bed in low position, Bed/Chair-Wheels locked, Bed/Chair alarm on, Call light within reach, Side rails X 3    A safety check occurred in the patient's room between off going nurse and oncoming nurse listed above.     The safety check included the below items  Area Items   H  High Alert Medications - Verify all high alert medication drips (heparin, PCA, etc.)   E  Equipment - Suction is set up for ALL patients (with karen)  - Red plugs utilized for all equipment (IV pumps, etc.)  - WOWs wiped down at end of shift.  - Room stocked with oxygen, suction, and other unit-specific supplies   A  Alarms - Bed alarm is set for fall risk patients  - Ensure chair alarm is in place and activated if patient is up in a chair   L  Lines - Check IV for any infiltration  - Goldstein bag is empty if patient has a Goldstein   - Tubing and IV bags are labeled   S  Safety   - Room is clean, patient is clean, and equipment is clean. - Hallways are clear from equipment besides carts. - Fall bracelet on for fall risk patients  - Ensure room is clear and free of clutter  - Suction is set up for ALL patients (with karen)  - Hallways are clear from equipment besides carts.    - Isolation precautions followed, supplies available outside room, sign posted     Mimi Gustafson RN

## 2017-03-12 NOTE — ROUTINE PROCESS
Bedside and Verbal shift change report given to SANGEETHA Mendez (oncoming nurse) by Taryn Tubbs RN (offgoing nurse). Report included the following information SBAR, Kardex, MAR and Recent Results. SITUATION:    Code Status: Full Code   Reason for Admission: Fracture of greater trochanter ([address]  [address] day: 2   Problem List:       Hospital Problems  Date Reviewed: 10/26/2016          Codes Class Noted POA    Fall ICD-10-CM: W19. Jeffry Dietz  ICD-9-CM: E888.9  3/10/2017 Unknown        Fracture of greater trochanter ([address] ICD-10-CM: H67.898W  ICD-9-CM: 820.20  3/10/2017 Unknown              BACKGROUND:    Past Medical History:   Past Medical History:   Diagnosis Date    Arrhythmia     a-fib has pacemaker    Arthritis of both knees     Severe, end-stage    Atrial fibrillation (HCC)     Atrial fibrillation (HCC)     Balance problems     Bursitis of right hip     Chronic; with mild arthritis    CAD (coronary artery disease)     pt denies    Cardiomyopathy ([address]     Cerebral microvascular disease     CT 10/2014    Distal radius fracture, left     Easy bruising     Easy bleeding    Extrinsic asthma, unspecified     Fall     H/O mitral valve prolapse     Hearing loss     Hemangioma     CT 10/2014    History of ankle fracture     HX OTHER MEDICAL     walker    Hypothyroidism     Idiopathic osteoporosis     Long term (current) use of anticoagulants     Memory loss     Moderate persistent asthma     Osteoporosis     Peptic ulcer     Pneumonia     Pure hyperglyceridemia     Right foot pain 2014    Thyroid disease     Urinary problem     Wears glasses     Weight loss     Mild         Patient taking anticoagulants yes     ASSESSMENT:    Changes in Assessment Throughout Shift: no     Patient has Central Line: no Reasons if yes: n/a   Patient has Goldstein Cath: no Reasons if yes: n/a      Last Vitals:     Vitals:    03/12/17 0410 03/12/17 0849 03/12/17 1147 03/12/17 1608   BP: 127/55 142/59 106/54 128/65   Pulse: 72 60 62 64   Resp: 18 18 17 18   Temp: 98.4 °F (36.9 °C) 98.9 °F (37.2 °C) 97.9 °F (36.6 °C) 98.7 °F (37.1 °C)   SpO2: 93% 92% 96% 97%   Weight:       Height:            IV and DRAINS (will only show if present)         WOUND (if present)   Wound Type:  none   Dressing present Dressing Present : Yes   Wound Concerns/Notes:  none     PAIN    Pain Assessment    Pain Intensity 1: 0 (03/12/17 1902)    Pain Location 1: Hip    Pain Intervention(s) 1: Medication (see MAR), Repositioned    Patient Stated Pain Goal: 0  o Interventions for Pain:  tylenol  o Intervention effective: yes  o Time of last intervention: 1802   o Reassessment Completed: yes      Last 3 Weights:  Last 3 Recorded Weights in this Encounter    03/11/17 0607   Weight: 56.7 kg (125 lb)     Weight change:      INTAKE/OUPUT    Current Shift:      Last three shifts: 03/11 0701 - 03/12 1900  In: -   Out: 300 [Urine:300]     LAB RESULTS     Recent Labs      03/12/17   0455  03/11/17   0424  03/10/17   0939   WBC  7.6  6.1  8.0   HGB  9.2*  8.9*  10.9*   HCT  29.2*  28.4*  34.2*   PLT  158  152  197        Recent Labs      03/12/17   0455  03/11/17   0424  03/10/17   1050  03/10/17   0939   NA  139  141  140   --    K  4.1  3.8  4.0   --    GLU  96  113*  93   --    BUN  27*  27*  33*   --    CREA  0.55*  0.72  0.66   --    CA  8.1*  8.1*  8.7   --    INR   --    --    --   1.1       RECOMMENDATIONS AND DISCHARGE PLANNING     1. Pending tests/procedures/ Plan of Care or Other Needs: Discharge     2. Discharge plan for patient and Needs/Barriers: TBD    3. Estimated Discharge Date: TBD Posted on Whiteboard in Patients Room: yes      4. The patient's care plan was reviewed with the oncoming nurse.        \"HEALS\" SAFETY CHECK      Fall Risk    Total Score: 3    Safety Measures: Safety Measures: Bed in low position, Bed/Chair-Wheels locked, Bed/Chair alarm on, Call light within reach, Side rails X 3    A safety check occurred in the patient's room between off going nurse and oncoming nurse listed above. The safety check included the below items  Area Items   H  High Alert Medications - Verify all high alert medication drips (heparin, PCA, etc.)   E  Equipment - Suction is set up for ALL patients (with karen)  - Red plugs utilized for all equipment (IV pumps, etc.)  - WOWs wiped down at end of shift.  - Room stocked with oxygen, suction, and other unit-specific supplies   A  Alarms - Bed alarm is set for fall risk patients  - Ensure chair alarm is in place and activated if patient is up in a chair   L  Lines - Check IV for any infiltration  - Goldstein bag is empty if patient has a Goldstein   - Tubing and IV bags are labeled   S  Safety   - Room is clean, patient is clean, and equipment is clean. - Hallways are clear from equipment besides carts. - Fall bracelet on for fall risk patients  - Ensure room is clear and free of clutter  - Suction is set up for ALL patients (with karen)  - Hallways are clear from equipment besides carts.    - Isolation precautions followed, supplies available outside room, sign posted     Nereyda Canales RN

## 2017-03-12 NOTE — PROGRESS NOTES
Problem: Mobility Impaired (Adult and Pediatric)  Goal: *Acute Goals and Plan of Care (Insert Text)  Physical Therapy Goals  Initiated 3/12/2017 and to be accomplished within 7 day(s)  1. Patient will move from supine to sit and sit to supine , scoot up and down and roll side to side in bed with supervision/set-up. 2. Patient will transfer from bed to chair and chair to bed with moderate assistance using the least restrictive device. 3. Patient will perform sit to stand with moderate assistance while maintaining NWB to RLE to improve ease of transfers. PHYSICAL THERAPY EVALUATION     Patient: Jimmy Mariee (62 y.o. female)  Date: 3/12/2017  Primary Diagnosis: Fracture of greater trochanter ([facility] [address]  Fall        Precautions:  NWB (NWB to RLE), Out of bed to chair      ASSESSMENT :  Based on the objective data described below, the patient presents with a functional decline in mobility as the result of a fall and fracture to the R greater trochanter. She is currently NWB to the RLE. During evaluation, pt presents mildly confused but cooperative and is able to follow simple commands with repeated verbal and visual cueing. She presents with mobility limitations as below requiring assistance for bed mobility and is currently total assist x 1-2 sit to stand at EOB in order to maintain NWB to RLE. D/t pt limited assistance during sit to stand, did not attempt to perform ambulation or chair transfer d/t concerns regarding pt being unable to maintain her WB precautions, particularly in light of her dementia. Instructed pt briefly in supine bed exercises for ROM of LEs to reduce R hip ROM limitations and reduce risk of secondary complications of limited mobility. Patient will benefit from skilled intervention to address the above impairments.  Patients rehabilitation potential is considered to be Fair  Factors which may influence rehabilitation potential include:   [ ]         None noted  [X]         Mental ability/status  [X]         Medical condition  [ ]         Home/family situation and support systems  [X]         Safety awareness  [ ]         Pain tolerance/management  [ ]         Other:        PLAN :  Recommendations and Planned Interventions:  [X]           Bed Mobility Training             [ ]    Neuromuscular Re-Education  [X]           Transfer Training                   [ ]    Orthotic/Prosthetic Training  [X]           Gait Training                          [X]    Modalities  [X]           Therapeutic Exercises          [ ]    Edema Management/Control  [X]           Therapeutic Activities            [X]    Patient and Family Training/Education  [ ]           Other (comment):     Frequency/Duration: Patient will be followed by physical therapy 4 times a week to address goals. Discharge Recommendations: Julio Parson  Further Equipment Recommendations for Discharge: N/A       G-CODES      Mobility  Current  CN= 100%   Goal  CK= 40-59%. The severity rating is based on the Level of Assistance required for Functional Mobility and ADLs.            G-CODES      Eval Complexity: History: HIGH Complexity :3+ comorbidities / personal factors will impact the outcome/ POC Exam:MEDIUM Complexity : 3 Standardized tests and measures addressing body structure, function, activity limitation and / or participation in recreation  Presentation: LOW Complexity : Stable, uncomplicated  Clinical Decision Making:Low Complexity     [facility] Standing Balance Scale  0: Pt performs 25% or less of standing activity (Max assist) CN, 100% impaired. 1: Pt supports self with upper extremities but requires therapist assistance. Pt performs 25-50% of effort (Mod assist) CM, 80% to <100% impaired. 1+: Pt supports self with upper extremities but requires therapist assistance. Pt performs >50% effort. (Min assist). CL, 60% to <80% impaired.   2: Pt supports self independently with both upper extremities (walker, crutches, parallel bars). CL, 60% to <80% impaired. 2+: Pt support self independently with 1 upper extremity (cane, crutch, 1 parallel bar). CK, 40% to <60% impaired. 3: Pt stands without upper extremity support for up to 30 seconds. CK, 40% to <60% impaired. 3+: Pt stands without upper extremity support for 30 seconds or greater. CJ, 20% to <40% impaired. 4: Pt independently moves and returns center of gravity 1-2 inches in one plane. CJ, 20% to <40% impaired. 4+: Pt independently moves and returns center of gravity 1-2 inches in multiple planes. CI, 1% to <20% impaired. 5: Pt independently moves and returns center of gravity in all planes greater than 2 inches. CH, 0% impaired. Overall Complexity:LOW       SUBJECTIVE:   Patient stated I'm tired.       OBJECTIVE DATA SUMMARY:       Past Medical History:   Diagnosis Date    Arrhythmia       a-fib has pacemaker    Arthritis of both knees       Severe, end-stage    Atrial fibrillation (HCC)      Atrial fibrillation (HCC)      Balance problems      Bursitis of right hip       Chronic; with mild arthritis    CAD (coronary artery disease)       pt denies    Cardiomyopathy ([address]      Cerebral microvascular disease       CT 10/2014    Distal radius fracture, left      Easy bruising       Easy bleeding    Extrinsic asthma, unspecified      Fall      H/O mitral valve prolapse      Hearing loss      Hemangioma       CT 10/2014    History of ankle fracture      HX OTHER MEDICAL       walker    Hypothyroidism      Idiopathic osteoporosis      Long term (current) use of anticoagulants      Memory loss      Moderate persistent asthma      Osteoporosis      Peptic ulcer      Pneumonia      Pure hyperglyceridemia      Right foot pain 2014    Thyroid disease      Urinary problem      Wears glasses      Weight loss       Mild     Past Surgical History:   Procedure Laterality Date    HX APPENDECTOMY        HX CHOLECYSTECTOMY  HX HEENT         Jaw surgery following injury    HX HERNIA REPAIR Bilateral      HX HYSTERECTOMY        HX ORTHOPAEDIC         procedure on back    HX ORTHOPAEDIC         Ankle surgery    HX OTHER SURGICAL         Breast implant surgeries    HX PACEMAKER        HX UROLOGICAL         Prior Level of Function/Home Situation: Pt previously lived with son with a CNA who comes to the home to help with some grooming activities. Son reports pt occasionally walks around home with walker, often uses WC in community. Independent with ADLs often with occasional assistance. Limited intermittently d/t dementia. Home Situation  Home Environment: Private residence  Wheelchair Ramp: Yes  One/Two Story Residence: One story  Living Alone: No  Support Systems: Child(mali), Family member(s)  Patient Expects to be Discharged to[de-identified] Other (comment) (patient stated she lives in a [facility])  Current DME Used/Available at Home: Raised toilet seat, Shower chair, Walker, rollator, Wheelchair  Critical Behavior:  Neurologic State: Confused  Safety/Judgement: Decreased awareness of need for safety  Psychosocial  Purposeful Interaction: Yes     Range Of Motion:  AROM: Generally decreased, functional, limited R hip mobility d/t pain. Functional Mobility:  Bed Mobility:  Rolling: Minimum assistance  Supine to Sit: Maximum assistance  Sit to Supine: Minimum assistance  Scooting: Minimum assistance  Balance:   Sitting: Impaired; With support  Sitting - Static: Fair (occasional)  Sitting - Dynamic: Fair (occasional)  Standing: Impaired  Standing - Static: Not tested (Total assist sit to stand to maintain NWB on RLE)     Therapeutic Exercises:   Briefly educated pt on performance of AAROM exercises in bed including heelslides, supine hip abd, ankle pumps and instructed pt and son to perform them several times a day. Pain:  Pt reports 0/10 pain or discomfort prior to treatment, but reports \"biting\" sensation in her R thigh.     Pt reports 0/10 pain or discomfort post treatment. Activity Tolerance:   Limited activity tolerance d/t fatigue and WB status. Please refer to the flowsheet for vital signs taken during this treatment. After treatment:   [ ]         Patient left in no apparent distress sitting up in chair  [X]         Patient left in no apparent distress in bed  [X]         Call bell left within reach  [X]         Nursing notified  [X]         Caregiver present (son)  [ ]         Bed alarm activated      COMMUNICATION/EDUCATION:   [X]         Fall prevention education was provided and the patient/caregiver indicated understanding. [X]         Patient/family have participated as able in goal setting and plan of care. [X]         Patient/family agree to work toward stated goals and plan of care. [ ]         Patient understands intent and goals of therapy, but is neutral about his/her participation. [ ]         Patient is unable to participate in goal setting and plan of care.      Thank you for this referral.  Lynne Phoenix, PT, DPT, ATC, CSCS   Time Calculation: 27 mins

## 2017-03-12 NOTE — ROUTINE PROCESS
Bedside and Verbal shift change report given to Carmen Warren RN (oncoming nurse) by Nohemy Voss RN (offgoing nurse). Report included the following information SBAR, Kardex, MAR and Recent Results. SITUATION:    Code Status: Full Code   Reason for Admission: Fracture of greater trochanter ([address]  [address] day: 1   Problem List:       Hospital Problems  Date Reviewed: 10/26/2016          Codes Class Noted POA    Fall ICD-10-CM: W19. Estuardo Bennett  ICD-9-CM: E888.9  3/10/2017 Unknown        Fracture of greater trochanter ([address] ICD-10-CM: H17.376I  ICD-9-CM: 820.20  3/10/2017 Unknown              BACKGROUND:    Past Medical History:   Past Medical History:   Diagnosis Date    Arrhythmia     a-fib has pacemaker    Arthritis of both knees     Severe, end-stage    Atrial fibrillation (HCC)     Atrial fibrillation (HCC)     Balance problems     Bursitis of right hip     Chronic; with mild arthritis    CAD (coronary artery disease)     pt denies    Cardiomyopathy ([address]     Cerebral microvascular disease     CT 10/2014    Distal radius fracture, left     Easy bruising     Easy bleeding    Extrinsic asthma, unspecified     Fall     H/O mitral valve prolapse     Hearing loss     Hemangioma     CT 10/2014    History of ankle fracture     HX OTHER MEDICAL     walker    Hypothyroidism     Idiopathic osteoporosis     Long term (current) use of anticoagulants     Memory loss     Moderate persistent asthma     Osteoporosis     Peptic ulcer     Pneumonia     Pure hyperglyceridemia     Right foot pain 2014    Thyroid disease     Urinary problem     Wears glasses     Weight loss     Mild         Patient taking anticoagulants no     ASSESSMENT:    Changes in Assessment Throughout Shift: none     Patient has Central Line: no Reasons if yes: na   Patient has Goldstein Cath: no Reasons if yes: na      Last Vitals:     Vitals:    03/11/17 0607 03/11/17 0847 03/11/17 1200 03/11/17 1559   BP: 151/72 101/59 110/55   Pulse:  63 62 63   Resp:  18 18 18   Temp:  97.9 °F (36.6 °C) 97 °F (36.1 °C) 97.7 °F (36.5 °C)   SpO2:  96% 97% 97%   Weight: 56.7 kg (125 lb)      Height: 5' 5\" (1.651 m)           IV and DRAINS (will only show if present)         WOUND (if present)   Wound Type:  none   Dressing present Dressing Present : Yes   Wound Concerns/Notes:  none     PAIN    Pain Assessment    Pain Intensity 1: 0 (03/11/17 1600)    Pain Location 1: Hip    Pain Intervention(s) 1: Medication (see MAR)    Patient Stated Pain Goal: 0  o Interventions for Pain:  reposition  o Intervention effective: yes  o Time of last intervention: 031117   o Reassessment Completed: yes      Last 3 Weights:  Last 3 Recorded Weights in this Encounter    03/11/17 0607   Weight: 56.7 kg (125 lb)     Weight change:      INTAKE/OUPUT    Current Shift:      Last three shifts: 03/10 0701 - 03/11 1900  In: -   Out: 1000 [Urine:1000]     LAB RESULTS     Recent Labs      03/11/17   0424  03/10/17   0939   WBC  6.1  8.0   HGB  8.9*  10.9*   HCT  28.4*  34.2*   PLT  152  197        Recent Labs      03/11/17   0424  03/10/17   1050  03/10/17   0939   NA  141  140   --    K  3.8  4.0   --    GLU  113*  93   --    BUN  27*  33*   --    CREA  0.72  0.66   --    CA  8.1*  8.7   --    INR   --    --   1.1       RECOMMENDATIONS AND DISCHARGE PLANNING     1. Pending tests/procedures/ Plan of Care or Other Needs: none     2. Discharge plan for patient and Needs/Barriers: Rehab    3. Estimated Discharge Date: [id number] Posted on Whiteboard in [facility]: yes      4. The patient's care plan was reviewed with the oncoming nurse. \"HEALS\" SAFETY CHECK      Fall Risk    Total Score: 3    Safety Measures: Safety Measures: Bed/Chair alarm on, Bed/Chair-Wheels locked, Bed in low position, Call light within reach, Fall prevention (comment)    A safety check occurred in the patient's room between off going nurse and oncoming nurse listed above.     The safety check included the below items  Area Items   H  High Alert Medications - Verify all high alert medication drips (heparin, PCA, etc.)   E  Equipment - Suction is set up for ALL patients (with karen)  - Red plugs utilized for all equipment (IV pumps, etc.)  - WOWs wiped down at end of shift.  - Room stocked with oxygen, suction, and other unit-specific supplies   A  Alarms - Bed alarm is set for fall risk patients  - Ensure chair alarm is in place and activated if patient is up in a chair   L  Lines - Check IV for any infiltration  - Goldstein bag is empty if patient has a Goldstein   - Tubing and IV bags are labeled   S  Safety   - Room is clean, patient is clean, and equipment is clean. - Hallways are clear from equipment besides carts. - Fall bracelet on for fall risk patients  - Ensure room is clear and free of clutter  - Suction is set up for ALL patients (with karen)  - Hallways are clear from equipment besides carts.    - Isolation precautions followed, supplies available outside room, sign posted     Bonnie Urena RN

## 2017-03-12 NOTE — PROGRESS NOTES
[facility]    Progress Note      Patient: Jairo Connolly               Sex: female          DOA: 3/10/2017         YOB: 1924      Age:  80 y.o.        LOS:  LOS: 2 days         S/P  * No surgery found *               Subjective:     No complaints oriented to self only today. Son not in room this am    Denies cp, sob, abd pain   Objective:      Blood pressure 127/55, pulse 72, temperature 98.4 °F (36.9 °C), resp. rate 18, height 5' 5\" (1.651 m), weight 125 lb (56.7 kg), SpO2 93 %.    Well developed, Well Nourished alert, cooperative, no distress  swelling and tenderness noted in right lower extremity  Sensory is intact   Motor is intact  nv intact  2+distal pulses  Neg calf tenderness    Labs:  CBC  @  CBC:   Lab Results   Component Value Date/Time    WBC 7.6 03/12/2017 04:55 AM    RBC 2.94 03/12/2017 04:55 AM    HGB 9.2 03/12/2017 04:55 AM    HCT 29.2 03/12/2017 04:55 AM    PLATELET 520 82/22/5714 04:55 AM     BMP:   Lab Results   Component Value Date/Time    Glucose 96 03/12/2017 04:55 AM    Sodium 139 03/12/2017 04:55 AM    Potassium 4.1 03/12/2017 04:55 AM    Chloride 104 03/12/2017 04:55 AM    CO2 28 03/12/2017 04:55 AM    BUN 27 03/12/2017 04:55 AM    Creatinine 0.55 03/12/2017 04:55 AM    Calcium 8.1 03/12/2017 04:55 AM   @  Coagulation  Lab Results   Component Value Date    INR 1.1 03/10/2017    APTT 31.5 03/10/2017      Basic Metabolic Profile  Lab Results   Component Value Date     03/12/2017    CO2 28 03/12/2017    BUN 27 (H) 03/12/2017       Medications Reviewed      Assessment/Plan     Active Problems:    Fall (3/10/2017)      Fracture of greater trochanter ([address] (3/10/2017)        * No surgery found * :     1. PT and/or OT Consults: YES continue PT/OT: oob to chair, gentle rom  , nwb right leg                                       3. Pain control  4.     4. DISCHARGE PLANNING     Intervention : Julio NORRIS)        Shirley Gutierres LUIZ [facility]  [phone]

## 2017-03-12 NOTE — PROGRESS NOTES
[facility]  Progress Note    Patient: Robert Iqbal Age: 80 y.o. : 1924 MR#: [id number] SSN: [id number]  Date/Time: 3/12/2017 2:17 PM    Subjective:     Seen with son @ bedside. Sleeping, rouses easily to vioce. Denies pain, F/C, N/V, CP, SOB. Case d/w case mgmt, dispo pending. Assessment/Plan:   1. R hip fx - ortho eval appreciated. Conservative tx, pain control, PT/OT/dispo planning. No new issues. 2. Chronic hypoxic resp failure due to COPD - no acute. On 2lpm around-the-clock. Outpt meds. 3. HTN - BPs wnl for most readings. Following. Have split lopressor dose to BID dosing while in-house. 4. SSS with hx pacer, AFib - ASA. BBlocker. No acute. 5. Hypothyroidism - Synthroid. TSH @ 2.38.   6. Dementia - by hx. At baseline. Outpt meds. 7. Dispo planning ARU vs [facility]. Additional Notes:      Case discussed with:  [x]Patient  [x]Family  []Nursing  [x]Case Management  DVT Prophylaxis:  []Lovenox  [x]Hep SQ  []SCDs  []Coumadin   []On Heparin gtt    Objective:   VS:   Visit Vitals    /54 (BP 1 Location: Left arm, BP Patient Position: Head of bed elevated (Comment degrees))    Pulse 62    Temp 97.9 °F (36.6 °C)    Resp 17    Ht 5' 5\" (1.651 m)    Wt 56.7 kg (125 lb)    SpO2 96%    BMI 20.8 kg/m2      Tmax/24hrs: Temp (24hrs), Av.1 °F (36.7 °C), Min:97.7 °F (36.5 °C), Max:98.9 °F (37.2 °C)      Intake/Output Summary (Last 24 hours) at 17 1448  Last data filed at 17 1910   Gross per 24 hour   Intake                0 ml   Output              300 ml   Net             -300 ml       General:  Sleeping, rouses to voice. Alert. NAD. Cardiovascular:  RRR 3/6 EVELINE. Pulmonary:  CTA B.   GI:  Soft, NT/ND, NABS. Extremities:  No CT or edema.    Additional:      Labs:    Recent Results (from the past 24 hour(s))   GLUCOSE, POC    Collection Time: 17 10:00 PM   Result Value Ref Range    Glucose (POC) 187 (H) 70 - 110 mg/dL   CBC WITH AUTOMATED DIFF    Collection Time: 03/12/17  4:55 AM   Result Value Ref Range    WBC 7.6 4.6 - 13.2 K/uL    RBC 2.94 (L) 4.20 - 5.30 M/uL    HGB 9.2 (L) 12.0 - 16.0 g/dL    HCT 29.2 (L) 35.0 - 45.0 %    MCV 99.3 (H) 74.0 - 97.0 FL    MCH 31.3 24.0 - 34.0 PG    MCHC 31.5 31.0 - 37.0 g/dL    RDW 14.2 11.6 - 14.5 %    PLATELET 640 095 - 703 K/uL    MPV 10.5 9.2 - 11.8 FL    NEUTROPHILS 74 (H) 40 - 73 %    LYMPHOCYTES 12 (L) 21 - 52 %    MONOCYTES 11 (H) 3 - 10 %    EOSINOPHILS 2 0 - 5 %    BASOPHILS 1 0 - 2 %    ABS. NEUTROPHILS 5.6 1.8 - 8.0 K/UL    ABS. LYMPHOCYTES 0.9 0.9 - 3.6 K/UL    ABS. MONOCYTES 0.9 0.05 - 1.2 K/UL    ABS. EOSINOPHILS 0.1 0.0 - 0.4 K/UL    ABS.  BASOPHILS 0.0 0.0 - 0.1 K/UL    DF AUTOMATED     METABOLIC PANEL, BASIC    Collection Time: 03/12/17  4:55 AM   Result Value Ref Range    Sodium 139 136 - 145 mmol/L    Potassium 4.1 3.5 - 5.5 mmol/L    Chloride 104 100 - 108 mmol/L    CO2 28 21 - 32 mmol/L    Anion gap 7 3.0 - 18 mmol/L    Glucose 96 74 - 99 mg/dL    BUN 27 (H) 7.0 - 18 MG/DL    Creatinine 0.55 (L) 0.6 - 1.3 MG/DL    BUN/Creatinine ratio 49 (H) 12 - 20      GFR est AA >60 >60 ml/min/1.73m2    GFR est non-AA >60 >60 ml/min/1.73m2    Calcium 8.1 (L) 8.5 - 10.1 MG/DL       Signed By: Hermann Nguyen MD     March 12, 2017 2:17 PM

## 2017-03-13 VITALS
DIASTOLIC BLOOD PRESSURE: 66 MMHG | HEART RATE: 60 BPM | WEIGHT: 125 LBS | HEIGHT: 65 IN | SYSTOLIC BLOOD PRESSURE: 105 MMHG | OXYGEN SATURATION: 92 % | TEMPERATURE: 96.3 F | RESPIRATION RATE: 18 BRPM | BODY MASS INDEX: 20.83 KG/M2

## 2017-03-13 LAB
ANION GAP BLD CALC-SCNC: 6 MMOL/L (ref 3–18)
BASOPHILS # BLD AUTO: 0 K/UL (ref 0–0.06)
BASOPHILS # BLD: 0 % (ref 0–2)
BUN SERPL-MCNC: 20 MG/DL (ref 7–18)
BUN/CREAT SERPL: 36 (ref 12–20)
CALCIUM SERPL-MCNC: 8.2 MG/DL (ref 8.5–10.1)
CHLORIDE SERPL-SCNC: 104 MMOL/L (ref 100–108)
CO2 SERPL-SCNC: 31 MMOL/L (ref 21–32)
CREAT SERPL-MCNC: 0.56 MG/DL (ref 0.6–1.3)
DIFFERENTIAL METHOD BLD: ABNORMAL
EOSINOPHIL # BLD: 0.2 K/UL (ref 0–0.4)
EOSINOPHIL NFR BLD: 3 % (ref 0–5)
ERYTHROCYTE [DISTWIDTH] IN BLOOD BY AUTOMATED COUNT: 14 % (ref 11.6–14.5)
GLUCOSE SERPL-MCNC: 105 MG/DL (ref 74–99)
HCT VFR BLD AUTO: 28.1 % (ref 35–45)
HGB BLD-MCNC: 8.8 G/DL (ref 12–16)
LYMPHOCYTES # BLD AUTO: 13 % (ref 21–52)
LYMPHOCYTES # BLD: 0.8 K/UL (ref 0.9–3.6)
MCH RBC QN AUTO: 31.4 PG (ref 24–34)
MCHC RBC AUTO-ENTMCNC: 31.3 G/DL (ref 31–37)
MCV RBC AUTO: 100.4 FL (ref 74–97)
MONOCYTES # BLD: 0.7 K/UL (ref 0.05–1.2)
MONOCYTES NFR BLD AUTO: 11 % (ref 3–10)
NEUTS SEG # BLD: 4.8 K/UL (ref 1.8–8)
NEUTS SEG NFR BLD AUTO: 73 % (ref 40–73)
PLATELET # BLD AUTO: 142 K/UL (ref 135–420)
PMV BLD AUTO: 10.7 FL (ref 9.2–11.8)
POTASSIUM SERPL-SCNC: 4 MMOL/L (ref 3.5–5.5)
RBC # BLD AUTO: 2.8 M/UL (ref 4.2–5.3)
SODIUM SERPL-SCNC: 141 MMOL/L (ref 136–145)
WBC # BLD AUTO: 6.6 K/UL (ref 4.6–13.2)

## 2017-03-13 PROCEDURE — 74011250637 HC RX REV CODE- 250/637: Performed by: FAMILY MEDICINE

## 2017-03-13 PROCEDURE — 77010033678 HC OXYGEN DAILY

## 2017-03-13 PROCEDURE — 85025 COMPLETE CBC W/AUTO DIFF WBC: CPT | Performed by: FAMILY MEDICINE

## 2017-03-13 PROCEDURE — 36415 COLL VENOUS BLD VENIPUNCTURE: CPT | Performed by: FAMILY MEDICINE

## 2017-03-13 PROCEDURE — 97530 THERAPEUTIC ACTIVITIES: CPT

## 2017-03-13 PROCEDURE — 80048 BASIC METABOLIC PNL TOTAL CA: CPT | Performed by: FAMILY MEDICINE

## 2017-03-13 PROCEDURE — 74011250636 HC RX REV CODE- 250/636: Performed by: FAMILY MEDICINE

## 2017-03-13 RX ORDER — ENOXAPARIN SODIUM 100 MG/ML
40 INJECTION SUBCUTANEOUS DAILY
Qty: 10 SYRINGE | Refills: 0 | Status: SHIPPED
Start: 2017-03-13

## 2017-03-13 RX ORDER — HYDROCODONE BITARTRATE AND ACETAMINOPHEN 5; 325 MG/1; MG/1
1 TABLET ORAL
Qty: 20 TAB | Refills: 0 | Status: SHIPPED | OUTPATIENT
Start: 2017-03-13

## 2017-03-13 RX ORDER — VITAMIN E 1000 UNIT
1000 CAPSULE ORAL DAILY
Qty: 30 TAB | Refills: 0 | Status: SHIPPED | OUTPATIENT
Start: 2017-03-13

## 2017-03-13 RX ADMIN — MEMANTINE 10 MG: 10 TABLET ORAL at 17:25

## 2017-03-13 RX ADMIN — ASPIRIN 81 MG CHEWABLE TABLET 81 MG: 81 TABLET CHEWABLE at 09:06

## 2017-03-13 RX ADMIN — FUROSEMIDE 40 MG: 40 TABLET ORAL at 09:06

## 2017-03-13 RX ADMIN — MEMANTINE 10 MG: 10 TABLET ORAL at 09:06

## 2017-03-13 RX ADMIN — METOPROLOL TARTRATE 12.5 MG: 25 TABLET ORAL at 09:07

## 2017-03-13 RX ADMIN — MULTIPLE VITAMINS W/ MINERALS TAB 1 TABLET: TAB at 09:07

## 2017-03-13 RX ADMIN — SERTRALINE HYDROCHLORIDE 100 MG: 50 TABLET ORAL at 09:06

## 2017-03-13 RX ADMIN — DOCUSATE SODIUM 100 MG: 100 CAPSULE, LIQUID FILLED ORAL at 09:06

## 2017-03-13 RX ADMIN — DOCUSATE SODIUM 100 MG: 100 CAPSULE, LIQUID FILLED ORAL at 17:25

## 2017-03-13 RX ADMIN — HEPARIN SODIUM 5000 UNITS: 5000 INJECTION, SOLUTION INTRAVENOUS; SUBCUTANEOUS at 13:15

## 2017-03-13 RX ADMIN — HEPARIN SODIUM 5000 UNITS: 5000 INJECTION, SOLUTION INTRAVENOUS; SUBCUTANEOUS at 06:43

## 2017-03-13 RX ADMIN — OXYBUTYNIN CHLORIDE 10 MG: 5 TABLET, FILM COATED, EXTENDED RELEASE ORAL at 09:06

## 2017-03-13 RX ADMIN — DONEPEZIL HYDROCHLORIDE 20 MG: 5 TABLET, FILM COATED ORAL at 09:06

## 2017-03-13 RX ADMIN — Medication 1000 MG: at 09:06

## 2017-03-13 RX ADMIN — LEVOTHYROXINE SODIUM 125 MCG: 100 TABLET ORAL at 09:06

## 2017-03-13 RX ADMIN — POTASSIUM CHLORIDE 10 MEQ: 750 TABLET, EXTENDED RELEASE ORAL at 09:07

## 2017-03-13 RX ADMIN — MEGESTROL ACETATE 200 MG: 40 SUSPENSION ORAL at 09:02

## 2017-03-13 RX ADMIN — VITAMIN D, TAB 1000IU (100/BT) 2000 UNITS: 25 TAB at 09:06

## 2017-03-13 RX ADMIN — HYDROCODONE BITARTRATE AND ACETAMINOPHEN 1 TABLET: 5; 325 TABLET ORAL at 06:43

## 2017-03-13 NOTE — PROGRESS NOTES
attended the interdisciplinary rounds for Gentry Hoffmann, who is a 80 y.o.,female. Patients Primary Language is: [place]. According to the patients EMR Zoroastrianism Affiliation is: [facility].     The reason the Patient came to the hospital is:   Patient Active Problem List    Diagnosis Date Noted    Fall 03/10/2017    Fracture of greater trochanter ([address] 03/10/2017    Accidental fall 10/07/2015    Multiple fractures of ribs of right side 10/07/2015    Hemothorax on right 10/07/2015    Asthma with COPD ([address] 10/07/2015    Pulmonary fibrosis ([address] 10/07/2015    PNA (pneumonia) 10/01/2015    CAP (community acquired pneumonia) 10/01/2015    Extrinsic asthma, unspecified     Pure hyperglyceridemia     Idiopathic osteoporosis     Memory loss     Pneumoperitoneum of unknown etiology 01/24/2015    Hemorrhoids 02/28/2014    Rectal bleeding 02/28/2014    Atrial fibrillation ([address] 02/28/2013    CAD (coronary artery disease) 02/28/2013    Hypothyroidism 02/28/2013    Osteoporosis 02/28/2013    Osteoarthritis (arthritis due to wear and tear of joints) 02/28/2013    Moderate persistent asthma           Plan:  Chaplains will continue to follow and will provide pastoral care on an as needed/requested basis.  recommends bedside caregivers page  on duty if patient shows signs of acute spiritual or emotional distress.     [address] [employer]  Board Certified [address] [facility]   [phone]

## 2017-03-13 NOTE — DISCHARGE SUMMARY
[address] [facility] [address] SUMMARY    Name:  Josefina Oneil  MR#:  [id number]  :  1924  Account #:  [de-identified]  Date of Adm:  03/10/2017  Date of Transfer:  2017      TRANSFER DIAGNOSES  1. Right hip fracture status post mechanical fall. 2. Chronic hypoxic respiratory failure due to chronic obstructive  pulmonary disease on 2 liters per minute around the clock. 3. Hypertension. 4. Sick sinus syndrome with a history of pacemaker implantation and  atrial fibrillation. 5. Hypothyroidism. 6. Dementia. SERVICE: The patient was admitted to the hospitalist service. CONSULTS: Dr. Lenny Lombard was consulted for Orthopedics. PROCEDURES: None. HISTORY OF PRESENT ILLNESS: Please refer to admission H and  P.    Briefly, the patient is a 80-year-old female with a history significant for  the above, who came to the emergency department complaining of  right hip pain. She had a fall and suffered right hip pain. History is  obtained primarily from the son as the patient has some mild dementia. She normally ambulates with a walker and on the morning of  presentation, the son heard a thump and found the patient half way  outside the bathroom. Apparently tried to ambulate off the toilet and  suffered a fall. He believed that she may have hit the right portion of  the back of her head on a door frame. The patient herself denied any  loss of consciousness, dizziness or lightheadedness, any headaches  or visual changes, chest pain or shortness of breath. No seizure  activity. Could not bear any weight when trying to get up again  because of right hip pain,  came to the emergency department for  further evaluation. Plain films showed right greater trochanteric fracture. CT of the head  negative for acute. PHYSICAL EXAMINATION  VITAL SIGNS: At presentation were stable and normal.  GENERAL: She appeared to be frail, elderly, but was otherwise  awake, alert, pleasant and conversant. She appeared to be a  comfortable. HEART: She had a regular heart, with a 3/6 systolic ejection murmur. LUNGS: Clear. ABDOMEN: Benign. EXTREMITIES: No calf tenderness or edema was noted. Right lower  extremity examination limited due to pain. Intact light touch sensation  bilateral lower extremities. Metabolic panel on admission was normal, CBC essentially normal as  well with an hemoglobin and hematocrit of 10.9/34.2. Chest x-ray negative for acute, report showing stable  cardiomegaly, chronic prominent interstitial markings predominantly  within the upper lungs without change consistent with chronic  interstitial infiltrates. CT of the head negative for acute. X-ray of the right hip showed right greater trochanteric fracture. HOSPITAL COURSE BY PROBLEM  1. Right hip fracture: Status post mechanical fall. Seen by Orthopedics. Conservative therapy, PT, OT, gentle range of motion and  nonweightbearing on right leg. Pain control at the current time with p.o.  medications. Maintain therapy. 2. Chronic hypoxic respiratory failure: Due to COPD. On 2 liters per  minute around the clock, no other acute issues at the current time. 3. Hypertension: Pressure had been within normal limits. We have split  her Lopressor dose to b.i.d. dosing while she has been in the hospital.  Continue as listed below. 4. History of sick sinus syndrome with pacemaker implantation and  atrial fibrillation: On aspirin, beta blocker therapy. No acute issues. 5. Hypothyroidism: Maintained on Synthroid. TSH was measured at  2.38.  6. Dementia: By history. Maintain outpatient medicines. On examination today, vital signs are stable and normal with a  pressure 143/68. The patient denies any fevers or chills, nausea, vomiting, chest pain,  shortness of breath, palpitations, or edema. She has a regular heart, clear lungs, benign abdomen. No calf  tenderness or edema is noted.     Labs reviewed today, include a CBC with an hemoglobin and  hematocrit of 8.8/28. 1. Metabolic panel within normal limits. DISPOSITION: Patient to be transferred to rehab or skilled nursing  facility. MEDICATIONS ON TRANSFER  NEW MEDICINE:  1. Norco 5/325 mg p.o. every 4 hours p.r.n. pain. OTHERWISE, RESUME THE FOLLOWIN. Vitamin C 1000 mg p.o. daily. 2. Lopressor 25 mg p.o. b.i.d.  3. Albuterol nebulizer every 6 hours as needed for wheezing or  shortness of breath. 4. Aspirin 81 mg p.o. daily. 5. Centrum Silver multivitamin p.o. daily. 6. Cholecalciferol 1000 units 2 tabs p.o. daily. 7. Colace 100 mg p.o. b.i.d., hold for loose stools. 8. Aricept 10 mg p.o. daily. 9. Lasix 40 mg p.o. daily. 10. Megace 400 mg per 10 mL suspension, to take 200 mg p.o. b.i.d.  11. Namenda 10 mg p.o. b.i.d.  12. Ditropan-XL 10 mg p.o. daily. 13. Potassium chloride 10 mEq p.o. daily. 14. Risperidone 0.5 mg p.o. at bedtime. 15. Synthroid 125 mcg p.o. daily. 16. Zoloft 100 mg p.o. daily. In addition to the above medications, I have written for Lovenox 40 mg  subcutaneously daily for DVT prophylaxis. We had kept her on heparin  while here in-house. Her creatinine clearance is calculated at 57.5. No  dose adjustment needed for Lovenox. Total time of transfer greater than 30 minutes. She should follow up with her primary care physician in 7-10 days and  with Orthopedics in 2-3 weeks.         Darren Cooper MD PP / MAYITO  D:  2017   10:41  T:  2017   11:13  Job #:  [id number]

## 2017-03-13 NOTE — PROGRESS NOTES
Message left for Sara Sanchez, from [facility] requesting a return call. Pt is discharging today. Spoke with Pt's son- Angie Gaines and discussed; If Pt not accepted to [facility] plan has been for Pt to go to [facility]. ([address] have already been given). Facility notified via CC link. Spoke with Raphael De Santiago from [facility]. Pt has been accepted to facility. Pt's medical transport time to [facility] today is 6:00 pm. Pt's son- Angie Gaines at bedside aware. Pt's nurse notified.

## 2017-03-13 NOTE — PROGRESS NOTES
ARU/IPR REFERRAL CONTACT NOTE  [id number] [place] [address] for Physical Rehabilitation    RE:Brooklyn Ballesteros     Thank you for the opportunity to review this patient's case for admission to [address] [place] [address] for Physical Rehabilitation. Based on our pre-admission screening:     [x ] This patient does not meet criteria for admission to [facility] for Physical  Rehabilitation because her plan is to transfer to [facility] today at 6pm.    Again, Thank you for this referral. Should you have any questions please do not hesitate to call. Sincerely,  Nicolette Sinha. Love Jimenes, [address]  Love Jimenes, RN  Admissions [facility] for Physical Rehabilitation  [phone]

## 2017-03-13 NOTE — PROGRESS NOTES
Problem: Mobility Impaired (Adult and Pediatric)  Goal: *Acute Goals and Plan of Care (Insert Text)  Physical Therapy Goals  Initiated 3/12/2017 and to be accomplished within 7 day(s)  1. Patient will move from supine to sit and sit to supine , scoot up and down and roll side to side in bed with supervision/set-up. 2. Patient will transfer from bed to chair and chair to bed with moderate assistance using the least restrictive device. 3. Patient will perform sit to stand with moderate assistance while maintaining NWB to RLE to improve ease of transfers. PHYSICAL THERAPY TREATMENT     Patient: Fartun Conway (56 y.o. female)  Date: 3/13/2017  Diagnosis: Fracture of greater trochanter ([address]  Fall   Precautions: NWB (NWB to RLE)  Chart, physical therapy assessment, plan of care and goals were reviewed. ASSESSMENT:  Patient presents supine in bed with soiled pad (incontinent of bladder) requiring assistance to clear soiled pad. Patient oriented to person and place only noting that she is unsure of the date or the reason for her being in the hospital requiring orientation to both. Patient demonstrates significant difficulty with mobility specific training 2/2 confusion, weakness, and lack of insight into her deficits. Progression toward goals:  [ ]      Improving appropriately and progressing toward goals  [X]      Improving slowly and progressing toward goals  [ ]      Not making progress toward goals and plan of care will be adjusted       PLAN:  Patient continues to benefit from skilled intervention to address the above impairments. Continue treatment per established plan of care. Discharge Recommendations:  Julio Parson  Further Equipment Recommendations for Discharge:  TBD at next level of care       G-CODES:      Mobility  Current  CM= 80-99%   Goal  CL= 60-79%. The severity rating is based on the Level of Assistance required for Functional Mobility and ADLs. SUBJECTIVE:   Patient stated I'm frustrated, but I don't know why.       OBJECTIVE DATA SUMMARY:   Functional Mobility Training:  Bed Mobility:  Rolling: Supervision  Supine to Sit: Minimum assistance (w/ verbal cueing for supine<>S/L<>Sitting)  Sit to Supine: Minimum assistance (R LE boost)  Scooting: Moderate assistance (Trunk assist)  Patient participates in EOB sitting activity with progressive increase in physical assistance required 2/2 on setting fatigue with          increased depth and frequency of breathing Patient seated EOB 10 min with 1 total assist trunk support break ~20 sec for recovery. Transfers:  Sit to Stand: Total assistance; Other (comment) (unable top complete >30% STS with total A and NWB R LE)  Balance:  Sitting: Impaired  Sitting - Static: Poor (constant support)  Sitting - Dynamic: Poor (constant support)  Standing: Impaired  Standing - Static: Not tested (unable to achieve STS transfer w/ NWB R LE; total assist pro)  Ambulation/Gait Training:  Gait Description (WDL):  (unable at this time)  Stairs:  Unable  Neuro Re-Education:  N/A  Therapeutic Exercises:   Heel slide x 8 reps constant verbal cueing for attention  Pain:  Pain Scale 1: Numeric (0 - 10)  Pain Intensity 1: 0  Pain Location 1: Hip  Pain Orientation 1: Right  Pain Description 1: Aching  Pain Intervention(s) 1: Medication (see MAR)  Activity Tolerance:   Poor/fair  Please refer to the flowsheet for vital signs taken during this treatment. After treatment:   [ ] Patient left in no apparent distress sitting up in chair  [X] Patient left in no apparent distress in bed  [X] Call bell left within reach  [X] Nursing notified  [ ] Caregiver present  [X] Bed alarm activated      Paulana Gobble.  Zapf, PT   Time Calculation: 24 mins

## 2017-03-13 NOTE — PROGRESS NOTES
TRANSFER - OUT REPORT:    Verbal report given to Nurse on Benedicto Pier  being transferred to [facility] (unit) for routine progression of care       Report consisted of patients Situation, Background, Assessment and   Recommendations(SBAR). Information from the following report(s) SBAR, Kardex, [facility] and Recent Results was reviewed with the receiving nurse. Lines:       Opportunity for questions and clarification was provided.       Patient transported with: EMT   Tech

## 2017-03-13 NOTE — PROGRESS NOTES
Occupational Therapy Note:  Orders received, chart reviewed and this patient is scheduled for SNF transfer this evening. Will defer any skilled OT needs to SNF level of care.  Mark Hooper, OTR/L

## 2017-03-13 NOTE — ROUTINE PROCESS
Bedside and Verbal shift change report given to IMELDA Alonso(oncoming nurse) by Facundo Chan (offgoing nurse). Report included the following information SBAR, Kardex, MAR and Recent Results. SITUATION:    Code Status: Full Code  Reason for Admission: Fracture of greater trochanter [facility])  [address] day: 3   Problem List:       Hospital Problems  Date Reviewed: 10/26/2016          Codes Class Noted POA    Fall ICD-10-CM: W19. Estuardo Bennett  ICD-9-CM: E888.9  3/10/2017 Unknown        Fracture of greater trochanter ([facility] [address] ICD-10-CM: K54.940Y  ICD-9-CM: 820.20  3/10/2017 Unknown              BACKGROUND:    Past Medical History:   Past Medical History:   Diagnosis Date    Arrhythmia     a-fib has pacemaker    Arthritis of both knees     Severe, end-stage    Atrial fibrillation (HCC)     Atrial fibrillation (HCC)     Balance problems     Bursitis of right hip     Chronic; with mild arthritis    CAD (coronary artery disease)     pt denies    Cardiomyopathy ([facility] [address]     Cerebral microvascular disease     CT 10/2014    Distal radius fracture, left     Easy bruising     Easy bleeding    Extrinsic asthma, unspecified     Fall     H/O mitral valve prolapse     Hearing loss     Hemangioma     CT 10/2014    History of ankle fracture     HX OTHER MEDICAL     walker    Hypothyroidism     Idiopathic osteoporosis     Long term (current) use of anticoagulants     Memory loss     Moderate persistent asthma     Osteoporosis     Peptic ulcer     Pneumonia     Pure hyperglyceridemia     Right foot pain 2014    Thyroid disease     Urinary problem     Wears glasses     Weight loss     Mild         Patient taking anticoagulants yes     ASSESSMENT:    Changes in Assessment Throughout Shift: no     Patient has Central Line: no Reasons if yes: n/a   Patient has Goldstein Cath: no Reasons if yes: n/a      Last Vitals:     Vitals:    03/12/17 1608 03/12/17 2325 03/13/17 0257 03/13/17 0453   BP: 128/65 149/73 160/71 146/73   Pulse: 64 85 93 61   Resp: 18 18 20 18   Temp: 98.7 °F (37.1 °C) 97.4 °F (36.3 °C) 97.7 °F (36.5 °C) 97.7 °F (36.5 °C)   SpO2: 97% 94% 95% 97%   Weight:       Height:            IV and DRAINS (will only show if present)         WOUND (if present)   Wound Type:  none   Dressing present Dressing Present : Yes   Wound Concerns/Notes:  none     PAIN    Pain Assessment    Pain Intensity 1: 0 (03/12/17 2000)    Pain Location 1: Hip    Pain Intervention(s) 1: Medication (see MAR), Repositioned    Patient Stated Pain Goal: 0  o Interventions for Pain:  tylenol  o Intervention effective: yes  o Time of last intervention: 1802   o Reassessment Completed: yes      Last 3 Weights:  Last 3 Recorded Weights in this Encounter    03/11/17 0607   Weight: 56.7 kg (125 lb)     Weight change:      INTAKE/OUPUT    Current Shift:      Last three shifts: 03/11 0701 - 03/12 1900  In: -   Out: 300 [Urine:300]     LAB RESULTS     Recent Labs      03/13/17   0341  03/12/17   0455  03/11/17   0424   WBC  6.6  7.6  6.1   HGB  8.8*  9.2*  8.9*   HCT  28.1*  29.2*  28.4*   PLT  142  158  152        Recent Labs      03/13/17   0341  03/12/17   0455  03/11/17   0424   03/10/17   0939   NA  141  139  141   < >   --    K  4.0  4.1  3.8   < >   --    GLU  105*  96  113*   < >   --    BUN  20*  27*  27*   < >   --    CREA  0.56*  0.55*  0.72   < >   --    CA  8.2*  8.1*  8.1*   < >   --    INR   --    --    --    --   1.1    < > = values in this interval not displayed. RECOMMENDATIONS AND DISCHARGE PLANNING     1. Pending tests/procedures/ Plan of Care or Other Needs: Discharge to SNF    2. Discharge plan for patient and Needs/Barriers: TBD    3. Estimated Discharge Date: 3/13 Posted on Whiteboard in [address] [place] [address] Room: yes      4. The patient's care plan was reviewed with the oncoming nurse.        \"HEALS\" SAFETY CHECK      Fall Risk    Total Score: 4    Safety Measures: Safety Measures: Bed/Chair alarm on, Bed/Chair-Wheels locked, Bed in low position, Call light within reach, Fall prevention (comment)    A safety check occurred in the patient's room between off going nurse and oncoming nurse listed above. The safety check included the below items  Area Items   H  High Alert Medications - Verify all high alert medication drips (heparin, PCA, etc.)   E  Equipment - Suction is set up for ALL patients (with karen)  - Red plugs utilized for all equipment (IV pumps, etc.)  - WOWs wiped down at end of shift.  - Room stocked with oxygen, suction, and other unit-specific supplies   A  Alarms - Bed alarm is set for fall risk patients  - Ensure chair alarm is in place and activated if patient is up in a chair   L  Lines - Check IV for any infiltration  - Goldstein bag is empty if patient has a Goldstein   - Tubing and IV bags are labeled   S  Safety   - Room is clean, patient is clean, and equipment is clean. - Hallways are clear from equipment besides carts. - Fall bracelet on for fall risk patients  - Ensure room is clear and free of clutter  - Suction is set up for ALL patients (with karen)  - Hallways are clear from equipment besides carts.    - Isolation precautions followed, supplies available outside room, sign posted     Vanessa Hebert

## 2017-03-29 ENCOUNTER — OFFICE VISIT (OUTPATIENT)
Dept: ORTHOPEDIC SURGERY | Age: 82
End: 2017-03-29

## 2017-03-29 VITALS — TEMPERATURE: 98 F | SYSTOLIC BLOOD PRESSURE: 100 MMHG | DIASTOLIC BLOOD PRESSURE: 44 MMHG | HEART RATE: 61 BPM

## 2017-03-29 DIAGNOSIS — S72.001D CLOSED RIGHT HIP FRACTURE, WITH ROUTINE HEALING, SUBSEQUENT ENCOUNTER: Primary | ICD-10-CM

## 2017-03-29 DIAGNOSIS — S72.114D CLOSED NONDISPLACED FRACTURE OF GREATER TROCHANTER OF RIGHT FEMUR WITH ROUTINE HEALING, SUBSEQUENT ENCOUNTER: ICD-10-CM

## 2017-03-29 NOTE — PROGRESS NOTES
[facility] Alberto [facility]  1924   Chief Complaint   Patient presents with    Hip Pain     Right        HISTORY OF PRESENT ILLNESS  Avni Schmidt is a 80 y.o. female who presents today for reevaluation of right hip. She suffered a fall on 3/10 and was admitted to the hospital. She was managed non-operatively and presents today in follow up. She has been doing bed to chair transfers only and non weight bearing on right leg. Patient denies any fever, chills, chest pain, shortness of breath or calf pain. There are no new illness or injuries to report since last seen in the office. No changes in medications, allergies, social or family history. PHYSICAL EXAM:   Visit Vitals    /44 (BP 1 Location: Left arm, BP Patient Position: Sitting)    Pulse 61    Temp 98 °F (36.7 °C) (Oral)     The patient is a well-developed, well-nourished female   in no acute distress. The patient is alert and oriented times three. The patient is alert and oriented times three. Mood and affect are normal.  LYMPHATIC: lymph nodes are not enlarged and are within normal limits  SKIN: normal in color and non tender to palpation. There are no bruises or abrasions noted. NEUROLOGICAL: Motor sensory exam is within normal limits. Reflexes are equal bilaterally. There is normal sensation to pinprick and light touch  MUSCULOSKELETAL:  Inspection of hip   No swelling  No ttp  Forward flexion 0-30 degrees  No instability  3/5 strength     IMAGIN view xray of right femur reveal no change in greater trochanteric fracture. No callus formation seen    IMPRESSION:      ICD-10-CM ICD-9-CM    1. Closed right hip fracture, with routine healing, subsequent encounter S72.001D V54.13         PLAN:   1. Patient continues to improve  2. Will cont nwb bed to chair transfers until 4/10/17  3. On 4/10/17 will start pwb with walker, no increases in pain    .  RTC 6 weeks    Follow-up Disposition: Not on [address] [place] [address], LUIZ  [facility] and Sp[facility] Specialist

## 2017-04-03 ENCOUNTER — HOME HEALTH ADMISSION (OUTPATIENT)
Dept: HOME HEALTH SERVICES | Facility: HOME HEALTH | Age: 82
End: 2017-04-03